# Patient Record
Sex: FEMALE | Employment: UNEMPLOYED | URBAN - METROPOLITAN AREA
[De-identification: names, ages, dates, MRNs, and addresses within clinical notes are randomized per-mention and may not be internally consistent; named-entity substitution may affect disease eponyms.]

---

## 2019-10-06 ENCOUNTER — APPOINTMENT (OUTPATIENT)
Dept: CT IMAGING | Age: 59
End: 2019-10-06
Attending: EMERGENCY MEDICINE
Payer: MEDICAID

## 2019-10-06 ENCOUNTER — HOSPITAL ENCOUNTER (OUTPATIENT)
Age: 59
Setting detail: OBSERVATION
Discharge: HOME HEALTH | End: 2019-10-08
Attending: EMERGENCY MEDICINE | Admitting: INTERNAL MEDICINE
Payer: MEDICAID

## 2019-10-06 DIAGNOSIS — G45.9 TIA (TRANSIENT ISCHEMIC ATTACK): Primary | ICD-10-CM

## 2019-10-06 DIAGNOSIS — R41.82 ALTERED MENTAL STATUS, UNSPECIFIED ALTERED MENTAL STATUS TYPE: ICD-10-CM

## 2019-10-06 DIAGNOSIS — G31.84 MCI (MILD COGNITIVE IMPAIRMENT): ICD-10-CM

## 2019-10-06 DIAGNOSIS — E87.6 HYPOKALEMIA: ICD-10-CM

## 2019-10-06 LAB
ALBUMIN SERPL-MCNC: 4.2 G/DL (ref 3.5–5)
ALBUMIN/GLOB SERPL: 0.9 {RATIO} (ref 1.1–2.2)
ALP SERPL-CCNC: 136 U/L (ref 45–117)
ALT SERPL-CCNC: 14 U/L (ref 12–78)
ANION GAP SERPL CALC-SCNC: 15 MMOL/L (ref 5–15)
APPEARANCE UR: ABNORMAL
AST SERPL-CCNC: 15 U/L (ref 15–37)
BACTERIA URNS QL MICRO: ABNORMAL /HPF
BASOPHILS # BLD: 0 K/UL (ref 0–0.1)
BASOPHILS NFR BLD: 0 % (ref 0–1)
BILIRUB SERPL-MCNC: 0.7 MG/DL (ref 0.2–1)
BILIRUB UR QL CFM: NEGATIVE
BUN SERPL-MCNC: 18 MG/DL (ref 6–20)
BUN/CREAT SERPL: 13 (ref 12–20)
CALCIUM SERPL-MCNC: 10.2 MG/DL (ref 8.5–10.1)
CHLORIDE SERPL-SCNC: 101 MMOL/L (ref 97–108)
CO2 SERPL-SCNC: 25 MMOL/L (ref 21–32)
COLOR UR: ABNORMAL
CREAT SERPL-MCNC: 1.39 MG/DL (ref 0.55–1.02)
DIFFERENTIAL METHOD BLD: ABNORMAL
EOSINOPHIL # BLD: 0 K/UL (ref 0–0.4)
EOSINOPHIL NFR BLD: 0 % (ref 0–7)
EPITH CASTS URNS QL MICRO: ABNORMAL /LPF
ERYTHROCYTE [DISTWIDTH] IN BLOOD BY AUTOMATED COUNT: 14.6 % (ref 11.5–14.5)
GLOBULIN SER CALC-MCNC: 4.9 G/DL (ref 2–4)
GLUCOSE BLD STRIP.AUTO-MCNC: 114 MG/DL (ref 65–100)
GLUCOSE SERPL-MCNC: 128 MG/DL (ref 65–100)
GLUCOSE UR STRIP.AUTO-MCNC: NEGATIVE MG/DL
HCT VFR BLD AUTO: 37.6 % (ref 35–47)
HGB BLD-MCNC: 11.6 G/DL (ref 11.5–16)
HGB UR QL STRIP: ABNORMAL
IMM GRANULOCYTES # BLD AUTO: 0 K/UL (ref 0–0.04)
IMM GRANULOCYTES NFR BLD AUTO: 0 % (ref 0–0.5)
INR PPP: 1 (ref 0.9–1.1)
KETONES UR QL STRIP.AUTO: >80 MG/DL
LEUKOCYTE ESTERASE UR QL STRIP.AUTO: ABNORMAL
LYMPHOCYTES # BLD: 1.7 K/UL (ref 0.8–3.5)
LYMPHOCYTES NFR BLD: 33 % (ref 12–49)
MCH RBC QN AUTO: 23.4 PG (ref 26–34)
MCHC RBC AUTO-ENTMCNC: 30.9 G/DL (ref 30–36.5)
MCV RBC AUTO: 75.8 FL (ref 80–99)
MONOCYTES # BLD: 0.3 K/UL (ref 0–1)
MONOCYTES NFR BLD: 6 % (ref 5–13)
NEUTS SEG # BLD: 3.2 K/UL (ref 1.8–8)
NEUTS SEG NFR BLD: 61 % (ref 32–75)
NITRITE UR QL STRIP.AUTO: NEGATIVE
NRBC # BLD: 0 K/UL (ref 0–0.01)
NRBC BLD-RTO: 0 PER 100 WBC
PH UR STRIP: 6 [PH] (ref 5–8)
PLATELET # BLD AUTO: 410 K/UL (ref 150–400)
PMV BLD AUTO: 8.5 FL (ref 8.9–12.9)
POTASSIUM SERPL-SCNC: 3.1 MMOL/L (ref 3.5–5.1)
POTASSIUM SERPL-SCNC: 3.6 MMOL/L (ref 3.5–5.1)
PROT SERPL-MCNC: 9.1 G/DL (ref 6.4–8.2)
PROT UR STRIP-MCNC: 30 MG/DL
PROTHROMBIN TIME: 10.3 SEC (ref 9–11.1)
RBC # BLD AUTO: 4.96 M/UL (ref 3.8–5.2)
RBC #/AREA URNS HPF: ABNORMAL /HPF (ref 0–5)
SERVICE CMNT-IMP: ABNORMAL
SODIUM SERPL-SCNC: 141 MMOL/L (ref 136–145)
SP GR UR REFRACTOMETRY: 1.02 (ref 1–1.03)
TROPONIN I SERPL-MCNC: <0.05 NG/ML
UA: UC IF INDICATED,UAUC: ABNORMAL
UROBILINOGEN UR QL STRIP.AUTO: 1 EU/DL (ref 0.2–1)
WBC # BLD AUTO: 5.3 K/UL (ref 3.6–11)
WBC URNS QL MICRO: ABNORMAL /HPF (ref 0–4)

## 2019-10-06 PROCEDURE — 70450 CT HEAD/BRAIN W/O DYE: CPT

## 2019-10-06 PROCEDURE — 96361 HYDRATE IV INFUSION ADD-ON: CPT

## 2019-10-06 PROCEDURE — 71045 X-RAY EXAM CHEST 1 VIEW: CPT

## 2019-10-06 PROCEDURE — 99218 HC RM OBSERVATION: CPT

## 2019-10-06 PROCEDURE — 85025 COMPLETE CBC W/AUTO DIFF WBC: CPT

## 2019-10-06 PROCEDURE — 74011250636 HC RX REV CODE- 250/636: Performed by: EMERGENCY MEDICINE

## 2019-10-06 PROCEDURE — 93005 ELECTROCARDIOGRAM TRACING: CPT

## 2019-10-06 PROCEDURE — 74011250636 HC RX REV CODE- 250/636: Performed by: INTERNAL MEDICINE

## 2019-10-06 PROCEDURE — 84132 ASSAY OF SERUM POTASSIUM: CPT

## 2019-10-06 PROCEDURE — 99285 EMERGENCY DEPT VISIT HI MDM: CPT

## 2019-10-06 PROCEDURE — 80053 COMPREHEN METABOLIC PANEL: CPT

## 2019-10-06 PROCEDURE — 85610 PROTHROMBIN TIME: CPT

## 2019-10-06 PROCEDURE — 74011250637 HC RX REV CODE- 250/637: Performed by: INTERNAL MEDICINE

## 2019-10-06 PROCEDURE — 81001 URINALYSIS AUTO W/SCOPE: CPT

## 2019-10-06 PROCEDURE — 84484 ASSAY OF TROPONIN QUANT: CPT

## 2019-10-06 PROCEDURE — 74011250637 HC RX REV CODE- 250/637: Performed by: EMERGENCY MEDICINE

## 2019-10-06 PROCEDURE — 96372 THER/PROPH/DIAG INJ SC/IM: CPT

## 2019-10-06 PROCEDURE — 87086 URINE CULTURE/COLONY COUNT: CPT

## 2019-10-06 PROCEDURE — 36415 COLL VENOUS BLD VENIPUNCTURE: CPT

## 2019-10-06 PROCEDURE — 82962 GLUCOSE BLOOD TEST: CPT

## 2019-10-06 RX ORDER — CHOLECALCIFEROL (VITAMIN D3) 25 MCG
1 TABLET ORAL DAILY
Refills: 11 | COMMUNITY
Start: 2019-08-27

## 2019-10-06 RX ORDER — SODIUM CHLORIDE 0.9 % (FLUSH) 0.9 %
5-40 SYRINGE (ML) INJECTION EVERY 8 HOURS
Status: DISCONTINUED | OUTPATIENT
Start: 2019-10-06 | End: 2019-10-08 | Stop reason: HOSPADM

## 2019-10-06 RX ORDER — ALPRAZOLAM 0.25 MG/1
TABLET ORAL
COMMUNITY

## 2019-10-06 RX ORDER — FLUTICASONE PROPIONATE 50 MCG
1 SPRAY, SUSPENSION (ML) NASAL DAILY
COMMUNITY

## 2019-10-06 RX ORDER — PAROXETINE HYDROCHLORIDE 20 MG/1
40 TABLET, FILM COATED ORAL DAILY
Status: DISCONTINUED | OUTPATIENT
Start: 2019-10-07 | End: 2019-10-08 | Stop reason: HOSPADM

## 2019-10-06 RX ORDER — LABETALOL HCL 20 MG/4 ML
10 SYRINGE (ML) INTRAVENOUS
Status: ACTIVE | OUTPATIENT
Start: 2019-10-06 | End: 2019-10-07

## 2019-10-06 RX ORDER — PRAVASTATIN SODIUM 40 MG/1
40 TABLET ORAL
Status: DISCONTINUED | OUTPATIENT
Start: 2019-10-06 | End: 2019-10-07

## 2019-10-06 RX ORDER — OMEPRAZOLE 40 MG/1
40 CAPSULE, DELAYED RELEASE ORAL DAILY
COMMUNITY

## 2019-10-06 RX ORDER — POTASSIUM CHLORIDE 750 MG/1
40 TABLET, FILM COATED, EXTENDED RELEASE ORAL
Status: ACTIVE | OUTPATIENT
Start: 2019-10-06 | End: 2019-10-07

## 2019-10-06 RX ORDER — ALPRAZOLAM 0.25 MG/1
0.25 TABLET ORAL
Status: DISCONTINUED | OUTPATIENT
Start: 2019-10-06 | End: 2019-10-08 | Stop reason: HOSPADM

## 2019-10-06 RX ORDER — DEXTROMETHORPHAN HYDROBROMIDE, GUAIFENESIN 5; 100 MG/5ML; MG/5ML
650 LIQUID ORAL
COMMUNITY

## 2019-10-06 RX ORDER — GUAIFENESIN 100 MG/5ML
81 LIQUID (ML) ORAL DAILY
Status: DISCONTINUED | OUTPATIENT
Start: 2019-10-07 | End: 2019-10-08 | Stop reason: HOSPADM

## 2019-10-06 RX ORDER — SODIUM CHLORIDE 9 MG/ML
75 INJECTION, SOLUTION INTRAVENOUS CONTINUOUS
Status: DISCONTINUED | OUTPATIENT
Start: 2019-10-06 | End: 2019-10-07

## 2019-10-06 RX ORDER — HEPARIN SODIUM 5000 [USP'U]/ML
5000 INJECTION, SOLUTION INTRAVENOUS; SUBCUTANEOUS EVERY 12 HOURS
Status: DISCONTINUED | OUTPATIENT
Start: 2019-10-06 | End: 2019-10-07 | Stop reason: ALTCHOICE

## 2019-10-06 RX ORDER — ENOXAPARIN SODIUM 100 MG/ML
40 INJECTION SUBCUTANEOUS EVERY 24 HOURS
Status: DISCONTINUED | OUTPATIENT
Start: 2019-10-06 | End: 2019-10-06 | Stop reason: DRUGHIGH

## 2019-10-06 RX ORDER — ONDANSETRON 2 MG/ML
4 INJECTION INTRAMUSCULAR; INTRAVENOUS
Status: ACTIVE | OUTPATIENT
Start: 2019-10-06 | End: 2019-10-07

## 2019-10-06 RX ORDER — PANTOPRAZOLE SODIUM 40 MG/1
40 TABLET, DELAYED RELEASE ORAL DAILY
Status: DISCONTINUED | OUTPATIENT
Start: 2019-10-07 | End: 2019-10-08 | Stop reason: HOSPADM

## 2019-10-06 RX ORDER — PAROXETINE HYDROCHLORIDE 20 MG/1
20 TABLET, FILM COATED ORAL DAILY
Status: DISCONTINUED | OUTPATIENT
Start: 2019-10-07 | End: 2019-10-06

## 2019-10-06 RX ORDER — PAROXETINE HYDROCHLORIDE 40 MG/1
1 TABLET, FILM COATED ORAL DAILY
Refills: 7 | COMMUNITY
Start: 2019-09-09

## 2019-10-06 RX ORDER — ATORVASTATIN CALCIUM 20 MG/1
1 TABLET, FILM COATED ORAL DAILY
Refills: 7 | COMMUNITY
Start: 2019-09-09 | End: 2019-10-08

## 2019-10-06 RX ADMIN — Medication 10 ML: at 21:13

## 2019-10-06 RX ADMIN — PRAVASTATIN SODIUM 40 MG: 40 TABLET ORAL at 21:13

## 2019-10-06 RX ADMIN — SODIUM CHLORIDE 150 ML/HR: 900 INJECTION, SOLUTION INTRAVENOUS at 17:23

## 2019-10-06 RX ADMIN — HEPARIN SODIUM 5000 UNITS: 5000 INJECTION INTRAVENOUS; SUBCUTANEOUS at 21:12

## 2019-10-06 NOTE — PROGRESS NOTES
Telehospitalist: Patient apparently spit out oral potassium replacement.  Will recheck serum potassium and replete if still low

## 2019-10-06 NOTE — ED NOTES
Pt presents ambulatory to ED accompanied by son's girlfriend. Per son's girlfriend, pt \"vomited up her meds today and sounds sad\". During MD assessment, provider asked if pt is confused, pt's son's daughter states \"Yeah that's why I brought her, I don't know\". Pt and visitor poor historians regarding pt's PMH and overall pt presentation. Pt is alert and oriented x 4, RR even and unlabored, skin is warm and dry. Assesment completed and pt updated on plan of care. Emergency Department Nursing Plan of Care       The Nursing Plan of Care is developed from the Nursing assessment and Emergency Department Attending provider initial evaluation. The plan of care may be reviewed in the ED Provider note.     The Plan of Care was developed with the following considerations:   Patient / Family readiness to learn indicated by:verbalized understanding  Persons(s) to be included in education: patient  Barriers to Learning/Limitations:No    Signed     Bella Johnson RN    10/6/2019   5:35 PM

## 2019-10-06 NOTE — ED PROVIDER NOTES
EMERGENCY DEPARTMENT HISTORY AND PHYSICAL EXAM      Date: 10/6/2019  Patient Name: Kathie Daigle    History of Presenting Illness     Chief Complaint   Patient presents with    Vomiting     times two: per pt she was at [facility] on Friday and had abdominal pain: denies pain in triage       History Provided By: Patient and Patient's Son's Girlfriend    HPI: Kathie Daigle, 59 y.o. female with unknown PMHx, presents ambulatory to the ED with cc of vomiting x 2 days. Of note, the pt is accompanied by her son's girlfriend, who had originally stated the pt was having abdominal pain and vomiting, however the pt denied both of these. No active vomiting in the ED. Per pt's son's girlfriend, the pt is \"more confused than usual\" and her speech is \"different. \" Last known normal is unknown. Pt's son's girlfriend states that the pt lives alone and went to check on her at her home today, noting that she was [facility] sick. \"  Code S was initiated. History is limited due to patient's AMS. PCP: No primary care provider on file. Current Facility-Administered Medications   Medication Dose Route Frequency Provider Last Rate Last Dose    sodium chloride (NS) flush 5-40 mL  5-40 mL IntraVENous Q8H Giselle Wheeler MD        labetalol (NORMODYNE;TRANDATE) 20 mg/4 mL (5 mg/mL) injection 10 mg  10 mg IntraVENous NOW Mary Anne Mckeon MD        0.9% sodium chloride infusion  150 mL/hr IntraVENous CONTINUOUS Giselle Wheeler MD        potassium chloride SR (KLOR-CON 10) tablet 40 mEq  40 mEq Oral NOW Mary Anne Mckeon MD           Past History     Past Medical History:  No past medical history on file. Past Surgical History:  No past surgical history on file. Family History:  No family history on file.     Social History:  Social History     Tobacco Use    Smoking status: Not on file   Substance Use Topics    Alcohol use: Not on file    Drug use: Not on file       Allergies:  No Known Allergies    Review of Systems Review of Systems   Unable to perform ROS: Mental status change     Physical Exam   Physical Exam   Constitutional: She is oriented to person, place, and time. She appears well-developed and well-nourished. HENT:   Head: Normocephalic and atraumatic. Mouth/Throat: Oropharynx is clear and moist.   Eyes: Conjunctivae and EOM are normal.   Neck: Normal range of motion and full passive range of motion without pain. Neck supple. Cardiovascular: Normal rate, regular rhythm, S1 normal, S2 normal, normal heart sounds, intact distal pulses and normal pulses. No murmur heard. Pulmonary/Chest: Effort normal and breath sounds normal. No respiratory distress. She has no wheezes. Abdominal: Soft. Normal appearance and bowel sounds are normal. She exhibits no distension. There is no tenderness. There is no rebound. Musculoskeletal: Normal range of motion. Neurological: She is alert and oriented to person, place, and time. She has normal strength. Skin: Skin is warm, dry and intact. No rash noted. Psychiatric: She has a normal mood and affect. Her speech is normal and behavior is normal. Judgment and thought content normal.   Nursing note and vitals reviewed.     Diagnostic Study Results     Labs -     Recent Results (from the past 12 hour(s))   GLUCOSE, POC    Collection Time: 10/06/19  4:14 PM   Result Value Ref Range    Glucose (POC) 114 (H) 65 - 100 mg/dL    Performed by Scot Ogden    CBC WITH AUTOMATED DIFF    Collection Time: 10/06/19  4:20 PM   Result Value Ref Range    WBC 5.3 3.6 - 11.0 K/uL    RBC 4.96 3.80 - 5.20 M/uL    HGB 11.6 11.5 - 16.0 g/dL    HCT 37.6 35.0 - 47.0 %    MCV 75.8 (L) 80.0 - 99.0 FL    MCH 23.4 (L) 26.0 - 34.0 PG    MCHC 30.9 30.0 - 36.5 g/dL    RDW 14.6 (H) 11.5 - 14.5 %    PLATELET 957 (H) 014 - 400 K/uL    MPV 8.5 (L) 8.9 - 12.9 FL    NRBC 0.0 0  WBC    ABSOLUTE NRBC 0.00 0.00 - 0.01 K/uL    NEUTROPHILS 61 32 - 75 %    LYMPHOCYTES 33 12 - 49 %    MONOCYTES 6 5 - 13 % EOSINOPHILS 0 0 - 7 %    BASOPHILS 0 0 - 1 %    IMMATURE GRANULOCYTES 0 0.0 - 0.5 %    ABS. NEUTROPHILS 3.2 1.8 - 8.0 K/UL    ABS. LYMPHOCYTES 1.7 0.8 - 3.5 K/UL    ABS. MONOCYTES 0.3 0.0 - 1.0 K/UL    ABS. EOSINOPHILS 0.0 0.0 - 0.4 K/UL    ABS. BASOPHILS 0.0 0.0 - 0.1 K/UL    ABS. IMM. GRANS. 0.0 0.00 - 0.04 K/UL    DF AUTOMATED     METABOLIC PANEL, COMPREHENSIVE    Collection Time: 10/06/19  4:20 PM   Result Value Ref Range    Sodium 141 136 - 145 mmol/L    Potassium 3.1 (L) 3.5 - 5.1 mmol/L    Chloride 101 97 - 108 mmol/L    CO2 25 21 - 32 mmol/L    Anion gap 15 5 - 15 mmol/L    Glucose 128 (H) 65 - 100 mg/dL    BUN 18 6 - 20 MG/DL    Creatinine 1.39 (H) 0.55 - 1.02 MG/DL    BUN/Creatinine ratio 13 12 - 20      GFR est AA 47 (L) >60 ml/min/1.73m2    GFR est non-AA 39 (L) >60 ml/min/1.73m2    Calcium 10.2 (H) 8.5 - 10.1 MG/DL    Bilirubin, total 0.7 0.2 - 1.0 MG/DL    ALT (SGPT) 14 12 - 78 U/L    AST (SGOT) 15 15 - 37 U/L    Alk.  phosphatase 136 (H) 45 - 117 U/L    Protein, total 9.1 (H) 6.4 - 8.2 g/dL    Albumin 4.2 3.5 - 5.0 g/dL    Globulin 4.9 (H) 2.0 - 4.0 g/dL    A-G Ratio 0.9 (L) 1.1 - 2.2     TROPONIN I    Collection Time: 10/06/19  4:20 PM   Result Value Ref Range    Troponin-I, Qt. <0.05 <0.05 ng/mL   EKG, 12 LEAD, INITIAL    Collection Time: 10/06/19  4:23 PM   Result Value Ref Range    Ventricular Rate 103 BPM    Atrial Rate 103 BPM    P-R Interval 174 ms    QRS Duration 72 ms    Q-T Interval 356 ms    QTC Calculation (Bezet) 466 ms    Calculated P Axis 73 degrees    Calculated R Axis 66 degrees    Calculated T Axis 24 degrees    Diagnosis       Sinus tachycardia  Possible Left atrial enlargement  T wave abnormality, consider inferior ischemia  Abnormal ECG  No previous ECGs available     PROTHROMBIN TIME + INR    Collection Time: 10/06/19  4:30 PM   Result Value Ref Range    INR 1.0 0.9 - 1.1      Prothrombin time 10.3 9.0 - 11.1 sec       Radiologic Studies -   XR CHEST PORT   Final Result Impression: No acute process. CT HEAD WO CONT   Final Result   IMPRESSION:      No acute intracranial abnormality        CT Results  (Last 48 hours)               10/06/19 1658  CT HEAD WO CONT Final result    Impression:  IMPRESSION:       No acute intracranial abnormality       Narrative:  EXAM: CT HEAD WO CONT       INDICATION: AMS       COMPARISON: None. CONTRAST: None. TECHNIQUE: Unenhanced CT of the head was performed using 5 mm images. Brain and   bone windows were generated. CT dose reduction was achieved through use of a   standardized protocol tailored for this examination and automatic exposure   control for dose modulation. FINDINGS:   The ventricles and sulci are normal in size, shape and configuration and   midline. There is no significant white matter disease. There is no intracranial   hemorrhage, extra-axial collection, mass, mass effect or midline shift. The   basilar cisterns are open. No acute infarct is identified. The bone windows   demonstrate no abnormalities. The visualized portions of the paranasal sinuses   and mastoid air cells are clear. CXR Results  (Last 48 hours)               10/06/19 1658  XR CHEST PORT Final result    Impression:  Impression: No acute process. Narrative: Indication: Chest pain       Comparison: None       Portable exam of the chest obtained at 1635 demonstrates normal heart size. There is no acute process in the lung fields. The osseous structures are   unremarkable. Medical Decision Making   I am the first provider for this patient. I reviewed the vital signs, available nursing notes, past medical history, past surgical history, family history and social history. Vital Signs-Reviewed the patient's vital signs.   Patient Vitals for the past 12 hrs:   Temp Pulse Resp BP SpO2   10/06/19 1705  92 21  100 %   10/06/19 1700    126/79    10/06/19 1651  92 18  100 %   10/06/19 1630    133/88    10/06/19 1604    (!) 151/127    10/06/19 1603 98.7 °F (37.1 °C) (!) 120 18 (!) 128/101 98 %       Pulse Oximetry Analysis - 98% on RA    Cardiac Monitor:   Rate: 120 bpm  Rhythm: Sinus Tachycardia      EKG interpretation: (Preliminary)1623  Rhythm: sinus tachycardia; and regular . Rate (approx.): 103; Axis: normal; DC interval: normal; QRS interval: normal ; ST/T wave: T wave abnormality, consider inferior ischemia; Other findings: abnormal ekg. Written by Bryson Walsh. Angy Medina, ED Scribe, as dictated by Javed Farley MD    Records Reviewed: Nursing Notes    Provider Notes (Medical Decision Making):   DDx: AMS, hypertensive emergency, acute CVA, PNA, PE    ED Course:   Initial assessment performed. The patients presenting problems have been discussed, and they are in agreement with the care plan formulated and outlined with them. I have encouraged them to ask questions as they arise throughout their visit. Joce Felix  1960    Arrival time to ED: [address]: 80    Physician at Bedside: [address]     CT Order Time: 1618    ACT Page: [id number]    ACT Call Back: [id number]      Consult Note:  4:20 Deana Barker MD, spoke with Rico Aponte MD,  Specialty: Neurology  Discussed pt's hx, disposition, and available diagnostic and imaging results. Reviewed care plans. Consultant agrees with plans as outlined. Will evaluate pt via TeleNeurology and call back after his examination. Consult Note:  5:08 Deana Barker MD, spoke with Rico Aponte MD,  Specialty: Neurology  Discussed pt's hx, disposition, and available diagnostic and imaging results. Reviewed care plans. Consultant agrees with plans as outlined. She endorsed on examination that the pt had decreased sensation on the L side and confusion. She stated that right hemisphere strokes can present this way. Would like for pt to have an MRI and 23 hour observation.      CONSULT NOTE:   5:13 PM  Javed Farley MD, spoke with Eligio Hutchison Soco Gaston MD,   Specialty: Hospitalist  Discussed pt's hx, disposition, and available diagnostic and imaging results. Reviewed care plans. Consultant will evaluate pt for admission. Written by Orlin Chicas. Den Najera, ED Scribe, as dictated by Sherry Estes MD      Critical Care Time:   CRITICAL CARE NOTE :  5:14 PM    IMPENDING DETERIORATION -CNS and Metabolic  ASSOCIATED RISK FACTORS - Metabolic changes and CNS Decompensation  MANAGEMENT- Bedside Assessment and Supervision of Care  INTERPRETATION -  Xrays, CT Scan, ECG and Blood Pressure  INTERVENTIONS - Neurologic interventions  and Metobolic interventions  CASE REVIEW - Hospitalist, Medical Sub-Specialist, Nursing and Family  TREATMENT RESPONSE -Stable  PERFORMED BY - Self    NOTES   :  I have spent 35 minutes of critical care time involved in lab review, consultations with specialist, family decision- making, bedside attention and documentation. During this entire length of time I was immediately available to the patient . Sherry Estes MD      Disposition:  Admit Note:  5:13 PM  Pt is being admitted by Dr. Soco Gaston. The results of their tests and reason(s) for their admission have been discussed with pt and/or available family. They convey agreement and understanding for the need to be admitted and for admission diagnosis. PLAN:  1. Admit to Hospitalist    Diagnosis     Clinical Impression: No diagnosis found. This note is prepared by Orlin Chicas. Den Najera, acting as Scribe for MD Sherry Piedra MD: The scribe's documentation has been prepared under my direction and personally reviewed by me in its entirety. I confirm that the note above accurately reflects all work, treatment, procedures, and medical decision making performed by me. This note will not be viewable in [address]

## 2019-10-06 NOTE — PROGRESS NOTES
Patient arrived from the ED. Ambulated to her bed, and attached to telemetry, box 16. Vital signs obtained, and shift report given to [facility], RN.

## 2019-10-06 NOTE — PROGRESS NOTES
Patient potassium 3.1 and was unable to take potassium PO in ED. Notified on call MD via tele [employer] for replenishment. Awaiting response.

## 2019-10-06 NOTE — ED NOTES
During tele-neuro assessment, pt reports she has \"speech problem\" and is \"supposed to wear hearing aids\" but she does not have them, pt also reports she was in speech therapy \"a long time ago\".

## 2019-10-06 NOTE — PROGRESS NOTES
Pt admitted remotely by myself , following discussion with ED physician, and review of labwork, radiology, old notes and patient care record. Care on castro followed via [address] Record remotely , as well as with discussion with nursing staff on castro.   A/P    Acute encephalopathy and slurred speech:POA rule out TIA versus CVA  -tele admission  -permissive HTN  -ASA/Plavix  -High dose statin  -Fasting lipid panel, Hba1c  -neurology consult  -echo, MRI head and MRA head and neck  -dysphagia screening  PT/OT eval    Hyperlipidemia   resume statin    GERD  Resume omeprazole    History of anxiety/depression  Resume Paxil  Xanax as needed) home medication     Hypokalemia  replace      FULL HMP TO FOLLOW

## 2019-10-06 NOTE — ED NOTES
Radiology reports they are ready for pt in CT. Tele-neurologist at bedside via computer speaking with pt.   Per neurologist, pt can go to CT after she finishes pt's neuro exam.

## 2019-10-06 NOTE — ED NOTES
TRANSFER - OUT REPORT:    Verbal report given to LATASHA Garcia(name) on Jose Hester  being transferred to 226(unit) for routine progression of care       Report consisted of patients Situation, Background, Assessment and   Recommendations(SBAR). Information from the following report(s) SBAR, ED Summary, [facility] and Recent Results was reviewed with the receiving nurse. Lines:   Peripheral IV 10/06/19 Left Antecubital (Active)   Site Assessment Clean, dry, & intact 10/6/2019  5:15 PM   Phlebitis Assessment 0 10/6/2019  5:15 PM   Infiltration Assessment 0 10/6/2019  5:15 PM   Dressing Status Clean, dry, & intact 10/6/2019  5:15 PM   Dressing Type Transparent 10/6/2019  5:15 PM   Hub Color/Line Status Patent 10/6/2019  5:15 PM   Action Taken Blood drawn 10/6/2019  5:15 PM        Opportunity for questions and clarification was provided.       Patient transported with:   Registered Nurse

## 2019-10-07 ENCOUNTER — APPOINTMENT (OUTPATIENT)
Dept: VASCULAR SURGERY | Age: 59
End: 2019-10-07
Attending: INTERNAL MEDICINE
Payer: MEDICAID

## 2019-10-07 ENCOUNTER — APPOINTMENT (OUTPATIENT)
Dept: MRI IMAGING | Age: 59
End: 2019-10-07
Attending: INTERNAL MEDICINE
Payer: MEDICAID

## 2019-10-07 ENCOUNTER — APPOINTMENT (OUTPATIENT)
Dept: NON INVASIVE DIAGNOSTICS | Age: 59
End: 2019-10-07
Attending: INTERNAL MEDICINE
Payer: MEDICAID

## 2019-10-07 PROBLEM — N39.0 UTI (URINARY TRACT INFECTION): Status: ACTIVE | Noted: 2019-10-07

## 2019-10-07 LAB
ANION GAP SERPL CALC-SCNC: 10 MMOL/L (ref 5–15)
ATRIAL RATE: 103 BPM
BUN SERPL-MCNC: 19 MG/DL (ref 6–20)
BUN/CREAT SERPL: 20 (ref 12–20)
CALCIUM SERPL-MCNC: 9.2 MG/DL (ref 8.5–10.1)
CALCULATED P AXIS, ECG09: 73 DEGREES
CALCULATED R AXIS, ECG10: 66 DEGREES
CALCULATED T AXIS, ECG11: 24 DEGREES
CHLORIDE SERPL-SCNC: 105 MMOL/L (ref 97–108)
CHOLEST SERPL-MCNC: 163 MG/DL
CO2 SERPL-SCNC: 27 MMOL/L (ref 21–32)
CREAT SERPL-MCNC: 0.97 MG/DL (ref 0.55–1.02)
DIAGNOSIS, 93000: NORMAL
ERYTHROCYTE [DISTWIDTH] IN BLOOD BY AUTOMATED COUNT: 14.5 % (ref 11.5–14.5)
EST. AVERAGE GLUCOSE BLD GHB EST-MCNC: 126 MG/DL
GLUCOSE SERPL-MCNC: 101 MG/DL (ref 65–100)
HBA1C MFR BLD: 6 % (ref 4.2–6.3)
HCT VFR BLD AUTO: 32.7 % (ref 35–47)
HDLC SERPL-MCNC: 38 MG/DL
HDLC SERPL: 4.3 {RATIO} (ref 0–5)
HGB BLD-MCNC: 10.1 G/DL (ref 11.5–16)
LDLC SERPL CALC-MCNC: 108.2 MG/DL (ref 0–100)
LEFT CCA PROX DIAS: 25.4 CM/S
LEFT CCA PROX SYS: 100.4 CM/S
LEFT ICA DIST DIAS: 27.4 CM/S
LEFT ICA DIST SYS: 108.2 CM/S
LEFT ICA MID DIAS: 23.3 CM/S
LEFT ICA MID SYS: 75.3 CM/S
LEFT ICA PROX DIAS: 23.4 CM/S
LEFT ICA PROX SYS: 84.9 CM/S
LEFT ICA/CCA SYS: 1.08
LEFT VERTEBRAL DIAS: 17.55 CM/S
LEFT VERTEBRAL SYS: 57 CM/S
LIPID PROFILE,FLP: ABNORMAL
MCH RBC QN AUTO: 23.5 PG (ref 26–34)
MCHC RBC AUTO-ENTMCNC: 30.9 G/DL (ref 30–36.5)
MCV RBC AUTO: 76.2 FL (ref 80–99)
NRBC # BLD: 0 K/UL (ref 0–0.01)
NRBC BLD-RTO: 0 PER 100 WBC
P-R INTERVAL, ECG05: 174 MS
PLATELET # BLD AUTO: 344 K/UL (ref 150–400)
PMV BLD AUTO: 8.7 FL (ref 8.9–12.9)
POTASSIUM SERPL-SCNC: 3.1 MMOL/L (ref 3.5–5.1)
Q-T INTERVAL, ECG07: 356 MS
QRS DURATION, ECG06: 72 MS
QTC CALCULATION (BEZET), ECG08: 466 MS
RBC # BLD AUTO: 4.29 M/UL (ref 3.8–5.2)
RIGHT CCA DIST DIAS: 19.5 CM/S
RIGHT CCA DIST SYS: 63.7 CM/S
RIGHT CCA PROX DIAS: 19.5 CM/S
RIGHT CCA PROX SYS: 76.8 CM/S
RIGHT ICA DIST DIAS: 16.9 CM/S
RIGHT ICA DIST SYS: 48.2 CM/S
RIGHT ICA MID DIAS: 16.9 CM/S
RIGHT ICA MID SYS: 48.2 CM/S
RIGHT ICA PROX DIAS: 21.1 CM/S
RIGHT ICA PROX SYS: 60.4 CM/S
RIGHT ICA/CCA SYS: 1
RIGHT VERTEBRAL DIAS: 17.03 CM/S
RIGHT VERTEBRAL SYS: 70.6 CM/S
SODIUM SERPL-SCNC: 142 MMOL/L (ref 136–145)
TRIGL SERPL-MCNC: 84 MG/DL (ref ?–150)
VENTRICULAR RATE, ECG03: 103 BPM
VLDLC SERPL CALC-MCNC: 16.8 MG/DL
WBC # BLD AUTO: 5.7 K/UL (ref 3.6–11)

## 2019-10-07 PROCEDURE — 70551 MRI BRAIN STEM W/O DYE: CPT

## 2019-10-07 PROCEDURE — 74011250636 HC RX REV CODE- 250/636: Performed by: INTERNAL MEDICINE

## 2019-10-07 PROCEDURE — 93880 EXTRACRANIAL BILAT STUDY: CPT

## 2019-10-07 PROCEDURE — 96375 TX/PRO/DX INJ NEW DRUG ADDON: CPT

## 2019-10-07 PROCEDURE — 96372 THER/PROPH/DIAG INJ SC/IM: CPT

## 2019-10-07 PROCEDURE — 97112 NEUROMUSCULAR REEDUCATION: CPT | Performed by: OCCUPATIONAL THERAPIST

## 2019-10-07 PROCEDURE — 96365 THER/PROPH/DIAG IV INF INIT: CPT

## 2019-10-07 PROCEDURE — 80061 LIPID PANEL: CPT

## 2019-10-07 PROCEDURE — 80048 BASIC METABOLIC PNL TOTAL CA: CPT

## 2019-10-07 PROCEDURE — 36415 COLL VENOUS BLD VENIPUNCTURE: CPT

## 2019-10-07 PROCEDURE — 97116 GAIT TRAINING THERAPY: CPT

## 2019-10-07 PROCEDURE — 97161 PT EVAL LOW COMPLEX 20 MIN: CPT

## 2019-10-07 PROCEDURE — 96361 HYDRATE IV INFUSION ADD-ON: CPT

## 2019-10-07 PROCEDURE — 74011250637 HC RX REV CODE- 250/637: Performed by: INTERNAL MEDICINE

## 2019-10-07 PROCEDURE — 74011000258 HC RX REV CODE- 258: Performed by: INTERNAL MEDICINE

## 2019-10-07 PROCEDURE — 83036 HEMOGLOBIN GLYCOSYLATED A1C: CPT

## 2019-10-07 PROCEDURE — 97165 OT EVAL LOW COMPLEX 30 MIN: CPT | Performed by: OCCUPATIONAL THERAPIST

## 2019-10-07 PROCEDURE — 95816 EEG AWAKE AND DROWSY: CPT | Performed by: PSYCHIATRY & NEUROLOGY

## 2019-10-07 PROCEDURE — 85027 COMPLETE CBC AUTOMATED: CPT

## 2019-10-07 PROCEDURE — 99218 HC RM OBSERVATION: CPT

## 2019-10-07 RX ORDER — POTASSIUM CHLORIDE AND SODIUM CHLORIDE 900; 300 MG/100ML; MG/100ML
INJECTION, SOLUTION INTRAVENOUS CONTINUOUS
Status: DISCONTINUED | OUTPATIENT
Start: 2019-10-07 | End: 2019-10-08

## 2019-10-07 RX ORDER — ENOXAPARIN SODIUM 100 MG/ML
40 INJECTION SUBCUTANEOUS EVERY 24 HOURS
Status: DISCONTINUED | OUTPATIENT
Start: 2019-10-07 | End: 2019-10-08 | Stop reason: HOSPADM

## 2019-10-07 RX ORDER — SODIUM CHLORIDE 0.9 % (FLUSH) 0.9 %
10 SYRINGE (ML) INJECTION AS NEEDED
Status: DISCONTINUED | OUTPATIENT
Start: 2019-10-07 | End: 2019-10-08 | Stop reason: HOSPADM

## 2019-10-07 RX ORDER — ATORVASTATIN CALCIUM 40 MG/1
40 TABLET, FILM COATED ORAL
Status: DISCONTINUED | OUTPATIENT
Start: 2019-10-07 | End: 2019-10-08 | Stop reason: HOSPADM

## 2019-10-07 RX ORDER — SODIUM CHLORIDE 0.9 % (FLUSH) 0.9 %
SYRINGE (ML) INJECTION
Status: DISPENSED
Start: 2019-10-07 | End: 2019-10-08

## 2019-10-07 RX ORDER — POTASSIUM CHLORIDE 1.5 G/1.77G
40 POWDER, FOR SOLUTION ORAL
Status: COMPLETED | OUTPATIENT
Start: 2019-10-07 | End: 2019-10-07

## 2019-10-07 RX ADMIN — ASPIRIN 81 MG 81 MG: 81 TABLET ORAL at 08:58

## 2019-10-07 RX ADMIN — Medication 10 ML: at 14:22

## 2019-10-07 RX ADMIN — ENOXAPARIN SODIUM 40 MG: 100 INJECTION SUBCUTANEOUS at 08:57

## 2019-10-07 RX ADMIN — CEFTRIAXONE SODIUM 1 G: 1 INJECTION, POWDER, FOR SOLUTION INTRAMUSCULAR; INTRAVENOUS at 08:59

## 2019-10-07 RX ADMIN — Medication 10 ML: at 21:39

## 2019-10-07 RX ADMIN — PAROXETINE HYDROCHLORIDE 40 MG: 20 TABLET, FILM COATED ORAL at 08:58

## 2019-10-07 RX ADMIN — Medication 10 ML: at 15:30

## 2019-10-07 RX ADMIN — PANTOPRAZOLE SODIUM 40 MG: 40 TABLET, DELAYED RELEASE ORAL at 08:58

## 2019-10-07 RX ADMIN — Medication 10 ML: at 06:00

## 2019-10-07 RX ADMIN — POTASSIUM CHLORIDE 40 MEQ: 1.5 POWDER, FOR SOLUTION ORAL at 08:58

## 2019-10-07 RX ADMIN — SODIUM CHLORIDE 75 ML/HR: 900 INJECTION, SOLUTION INTRAVENOUS at 04:16

## 2019-10-07 RX ADMIN — POTASSIUM CHLORIDE AND SODIUM CHLORIDE: 900; 300 INJECTION, SOLUTION INTRAVENOUS at 11:02

## 2019-10-07 RX ADMIN — Medication 10 ML: at 15:31

## 2019-10-07 RX ADMIN — ATORVASTATIN CALCIUM 40 MG: 40 TABLET, FILM COATED ORAL at 21:38

## 2019-10-07 NOTE — PROGRESS NOTES
Notified on call tele hospitalist via tele med IQ of patient's AM potassium of 3.1, trending down from 3.6, awaiting response.

## 2019-10-07 NOTE — PROGRESS NOTES
Speech pathology  Orders received, chart reviewed, spoke with RN and Dr. Janet Santoro. Patient passed STAND and was placed on a regular diet. There were reports of slurred speech but patient's MRI is negative for acute infarct. Nursing reports no difficulties drinking out of water bottles but per report, patient has some trouble chewing/swallowing certain solids and RN report that she was on a mechanical soft diet [place] [facility] [place]. If only concern is with difficulty chewing harder solids, recommend either a mechanical soft or dental soft diet for ease of mastication. Formal speech/swallow evaluation not indicated at this time. Discussed case with Dr. Janet Santoro.      Thanks,  Feliberto Barr M.S. CCC-SLP

## 2019-10-07 NOTE — PROGRESS NOTES
Problem: Self Care Deficits Care Plan (Adult)  Goal: *Acute Goals and Plan of Care (Insert Text)  Description    FUNCTIONAL STATUS PRIOR TO ADMISSION: Patient was independent and active without use of DME per pt. She uses public transportation including for grocery shopping. Pt also stated mild confusion at times and falls due to dizziness. HOME SUPPORT: The patient lived alone with pt's son's girlfriend to provide assistance. Occupational Therapy Goals  Initiated 10/7/2019  1. Patient will perform grooming and bathing standing at sink with modified independence within 7 day(s). 2.  Patient will perform lower body dressing including gathering clothing with modified independence within 7 day(s). 3.  Patient will perform toilet transfers with modified independence within 7 day(s). 4.  Patient will perform all aspects of toileting with modified independence within 7 day(s). 5.  Patient will participate in upper extremity therapeutic exercise/activities with modified independence for 10 minutes within 7 day(s). 6.  Patient will utilize energy conservation techniques during functional activities with verbal and visual cues within 7 day(s). Outcome: Progressing Towards Goal     OCCUPATIONAL THERAPY EVALUATION  Patient: Saul Modi (67 y.o. female)  Date: 10/7/2019  Primary Diagnosis: TIA (transient ischemic attack) [G45.9]        Precautions: Fall    ASSESSMENT  Based on the objective data described below, the patient presents with slow processing and mild slurred speech, decreased endurance and safety following admission for possible CVA. CT of head negative and pt awaiting MRI. BUEs are symmetrical in strength, coordination and sensation. Pt lives alone and uses public transportation without cognitive assist.  Based on above pt will require cognitive assist for safety. Recommend MoCA next session.     Current Level of Function Impacting Discharge (ADLs/self-care): SBA to CGA for LE ADLs, toileting and functional mobility    Functional Outcome Measure: The patient scored Total A-D  Total A-D (Motor Function): 65/66 on the Fugl-White Assessment which is indicative of no impairment in upper extremity functional status. Other factors to consider for discharge: pt lives alone     Patient will benefit from skilled therapy intervention to address the above noted impairments. PLAN :  Recommendations and Planned Interventions: self care training, functional mobility training, therapeutic exercise, balance training, therapeutic activities, cognitive retraining, endurance activities, patient education, home safety training and family training/education    Frequency/Duration: Patient will be followed by occupational therapy 2 times a week to address goals. Recommendation for discharge: (in order for the patient to meet his/her long term goals)  Occupational therapy at least 2 days/week in the home     This discharge recommendation:  Has not yet been discussed the attending provider and/or case management    Equipment recommendations for successful discharge (if) home: TBD        SUBJECTIVE:   Patient stated I am here for a sour stomach and confusion.     OBJECTIVE DATA SUMMARY:   HISTORY:   History reviewed. No pertinent past medical history. History reviewed. No pertinent surgical history.   Expanded or extensive additional review of patient history:     Home Situation  Home Environment: Apartment  # Steps to Enter: 6  Rails to Enter: Yes  Hand Rails : Bilateral  One/Two Story Residence: Two story  # of Interior Steps: 14  Living Alone: Yes  Support Systems: Child(kosta)  Patient Expects to be Discharged to[de-identified] Apartment  Current DME Used/Available at Home: Shower chair  Tub or Shower Type: Tub/Shower combination    Hand dominance: Right    EXAMINATION OF PERFORMANCE DEFICITS:  Cognitive/Behavioral Status:  Neurologic State: Alert  Orientation Level: Oriented X4(slow processing)  Cognition: Decreased attention/concentration; Follows commands  Perception: Appears intact  Perseveration: No perseveration noted  Safety/Judgement: Awareness of environment; Fall prevention    Hearing: Auditory  Auditory Impairment: None    Vision/Perceptual:    Acuity: Able to read clock/calendar on wall without difficulty; Able to read employee name badge without difficulty    Corrective Lenses: Glasses    Range of Motion:  AROM: Generally decreased, functional  PROM: Generally decreased, functional                      Strength:  Strength: Generally decreased, functional(BUEs 4/5)                Coordination:  Coordination: Generally decreased, functional  Fine Motor Skills-Upper: Left Intact; Right Intact    Gross Motor Skills-Upper: Left Intact; Right Intact    Tone & Sensation:  Tone: Normal  Sensation: Impaired(pt c/o numbness in B hands- chronic)                      Balance:  Sitting: Intact  Standing: Impaired  Standing - Static: Good  Standing - Dynamic : Fair    Functional Mobility and Transfers for ADLs:  Bed Mobility:  Rolling: Modified independent  Supine to Sit: Modified independent  Sit to Supine: Modified independent  Scooting: Modified independent    Transfers:  Sit to Stand: Stand-by assistance  Stand to Sit: Stand-by assistance  Bed to Chair: Stand-by assistance  Bathroom Mobility: Contact guard assistance  Toilet Transfer : Stand-by assistance    ADL Assessment:  Feeding: Modified independent    Oral Facial Hygiene/Grooming: Stand-by assistance(standing at sink to wash hands)    Bathing: Contact guard assistance(for safety)    Upper Body Dressing: Setup    Lower Body Dressing: Contact guard assistance(for safety with standing to manage clothing)    Toileting: Contact guard assistance(for safety with standing to manage clothing)                ADL Intervention and task modifications:  Patient was educated on the benefits of maintaining activity tolerance, functional mobility, and independence with self care tasks during acute stay. Encouraged patient to be out of bed for all meals, perform daily ADLs (as approved by RN/MD regarding bathing etc), performing functional mobility to/from bathroom, and increasing time OOB daily with assist. Patient educated about the importance of maintaining activity tolerance to ensure safe return home and to baseline. Patient verbalized understanding of education. Cognitive Retraining  Safety/Judgement: Awareness of environment; Fall prevention    Functional Measure:  Fugl-White Assessment of Motor Recovery after Stroke: BUEs  Reflex Activity  Flexors/Biceps/Fingers: Can be elicited  Extensors/Triceps: Can be elicited  Reflex Subtotal: 4    Volitional Movement Within Synergies  Shoulder Retraction: Full  Shoulder Elevation: Full  Shoulder Abduction (90 degrees): Full  Shoulder External Rotation: Full  Elbow Flexion: Full  Forearm Supination: Full  Shoulder Adduction/Internal Rotation: Full  Elbow Extension: Full  Forearm Pronation: Full  Subtotal: 18    Volitional Movement Mixing Synergies  Hand to Lumbar Spine: Full  Shoulder Flexion (0-90 degrees): Full  Pronation-Supination: Full  Subtotal: 6    Volitional Movement With Little or No Synergy  Shoulder Abduction (0-90 degrees): Full  Shoulder Flexion ( degrees): Partial  Pronation/Supination: Full  Subtotal : 5    Normal Reflex Activity  Biceps, Triceps, Finger Flexors:  Full  Subtotal : 2    Upper Extremity Total   Upper Extremity Total: 35    Wrist  Stability at 15 Degree Dorsiflexion: Full  Repeated Dorsiflexion/ Volar Flexion: Full  Stability at 15 Degree Dorsiflexion: Full  Repeated Dorsiflexion/ Volar Flexion: Full  Circumduction: Full  Wrist Total: 10    Hand  Mass Flexion: Full  Mass Extension: Full  Grasp A: Full  Grasp B: Full  Grasp C: Full  Grasp D: Full  Grasp E: Full  Hand Total: 14    Coordination/Speed  Tremor: None  Dysmetria: None  Time: <1s  Coordination/Speed Total : 6    Total A-D  Total A-D (Motor Function): 65/66     This is a reliable/valid measure of arm function after a neurological event. It has established value to characterize functional status and for measuring spontaneous and therapy-induced recovery; tests proximal and distal motor functions. Fugl-White Assessment - UE scores recorded between five and 30 days post neurologic event can be used to predict UE recovery at six months post neurologic event. Severe = 0-21 points   Moderately Severe = 22-33 points   Moderate = 34-47 points   Mild = 48-66 points  [place] Masters, LIBERTAD Weiner, & SARAI Carpenter (1992). Measurement of motor recovery after stroke: Outcome assessment and sample size requirements. Stroke, 23, pp. 5881-5302.   ------------------------------------------------------------------------------------------------------------------------------------------------------------------  MCID:  Stroke:   Angelia Ruelas et al, 2001; n = 171; mean age 79 (6) years; assessed within 16 (12) days of stroke, Acute Stroke)  FMA Motor Scores from Admission to Discharge   10 point increase in FMA Upper Extremity = 1.5 change in discharge FIM   10 point increase in FMA Lower Extremity = 1.9 change in discharge FIM  MDC:   Stroke:   Rachel Canas et al, 2008, n = 14, mean age = 59.9 (14.6) years, assessed on average 14 (6.5) months post stroke, Chronic Stroke)   FMA = 5.2 points for the Upper Extremity portion of the assessment     Barthel Index:    Bathin  Bladder: 10  Bowels: 10  Groomin  Dressin  Feeding: 10  Mobility: 0  Stairs: 0  Toilet Use: 5  Transfer (Bed to Chair and Back): 10  Total: 55/100        The Barthel ADL Index: Guidelines  1. The index should be used as a record of what a patient does, not as a record of what a patient could do. 2. The main aim is to establish degree of independence from any help, physical or verbal, however minor and for whatever reason. 3. The need for supervision renders the patient not independent.   4. A patient's performance should be established using the best available evidence. Asking the patient, friends/relatives and nurses are the usual sources, but direct observation and common sense are also important. However direct testing is not needed. 5. Usually the patient's performance over the preceding 24-48 hours is important, but occasionally longer periods will be relevant. 6. Middle categories imply that the patient supplies over 50 per cent of the effort. 7. Use of aids to be independent is allowed. Geri Aleman., Barthel, D.W. (3437). Functional evaluation: the Barthel Index. [facility] (14)2. RORY Lincoln, Yessi Meza., Cristino Wiggins., [place], [address] (). Measuring the change indisability after inpatient rehabilitation; comparison of the responsiveness of the Barthel Index and Functional [place] Measure. Journal of Neurology, Neurosurgery, and Psychiatry, 66(4), 502-214. Lorrie Allred N.J.A, KYLE Babcock.AURE.JAYY, & Anatoly Gutiérrez M.A. (2004.) Assessment of post-stroke quality of life in cost-effectiveness studies: The usefulness of the Barthel Index and the EuroQoL-5D. Quality of Life Research, 15, 737-92        Occupational Therapy Evaluation Charge Determination   History Examination Decision-Making   LOW Complexity : Brief history review  MEDIUM Complexity : 3-5 performance deficits relating to physical, cognitive , or psychosocial skils that result in activity limitations and / or participation restrictions MEDIUM Complexity : Patient may present with comorbidities that affect occupational performnce.  Miniml to moderate modification of tasks or assistance (eg, physical or verbal ) with assesment(s) is necessary to enable patient to complete evaluation       Based on the above components, the patient evaluation is determined to be of the following complexity level: LOW   Pain Ratin/10    Activity Tolerance:   Fair and tolerates ADLs without rest breaks  Please refer to the flowsheet for vital signs taken during this treatment. After treatment patient left in no apparent distress:    Sitting in chair and Call bell within reach    COMMUNICATION/EDUCATION:   The patients plan of care was discussed with: Registered Nurse. Patient was educated regarding her deficit(s) of impaired cognition and slurred speech as this relates to her diagnosis of possible CVA. She demonstrated Guarded understanding as evidenced by some awareness of situation. Patient and/or family was verbally educated on the BE FAST acronym for signs/symptoms of CVA and TIA. All questions answered with patient indicating fair understanding. Home safety education was provided and the patient/caregiver indicated understanding., Patient/family have participated as able in goal setting and plan of care. and Patient/family agree to work toward stated goals and plan of care. This patients plan of care is appropriate for delegation to [facility].     Thank you for this referral.  Laura Tan OT  Time Calculation: 20 mins

## 2019-10-07 NOTE — PROGRESS NOTES
Bedside shift change report given to LATASHA Bentley/RUBENS Andrews (oncoming nurse) by Samson Le (offgoing nurse). Report included the following information SBAR, Kardex, Intake/Output, MAR, Recent Results and Cardiac Rhythm NSR,SB.

## 2019-10-07 NOTE — PROGRESS NOTES
Problem: Mobility Impaired (Adult and Pediatric)  Goal: *Acute Goals and Plan of Care (Insert Text)  Description    FUNCTIONAL STATUS PRIOR TO ADMISSION: Patient was independent and active without use of DME.    HOME SUPPORT PRIOR TO ADMISSION: The patient lived alone with family to provide assistance. Physical Therapy Goals  Initiated 10/7/2019  1. Patient will move from supine to sit and sit to supine , scoot up and down and roll side to side in bed with independence within 7 day(s). 2.  Patient will transfer from bed to chair and chair to bed with independence using the least restrictive device within 7 day(s). 3.  Patient will perform sit to stand with independence within 7 day(s). 4.  Patient will ambulate with independence for 400 feet with the least restrictive device within 7 day(s). 5.  Patient will ascend/descend 10 stairs with handrail(s) with supervision/set-up within 7 day(s). 6.  Patient will improve Carr Balance score by 7 points within 7 days. Outcome: Progressing Towards Goal  PHYSICAL THERAPY EVALUATION- NEURO POPULATION  Patient: Joce Sprain (42 y.o. female)  Date: 10/7/2019  Primary Diagnosis: TIA (transient ischemic attack) [G45.9]       Precautions:   Fall      ASSESSMENT  Based on the objective data described below, the patient presents with decreased safety awareness and mildly slurred speech, however baseline for pt is unknown. Pt. Is mod I for bed mobility and needs supervision level assistance for transfers. She ambulated 200' with supervision and no AD. Pt lives alone with family checking on her, she has 14 steps leading to her apartment. Pt will likely need increased family supervision at discharge. Current Level of Function Impacting Discharge (mobility/balance): Good mobility, assess safety awareness    Functional Outcome Measure: The patient scored Total: 45/56 on the Carr Balance Assessment which is indicative of low fall risk.       Patient will benefit from skilled therapy intervention to address the above noted impairments. PLAN :  Recommendations and Planned Interventions: gait training, therapeutic exercises and neuromuscular re-education      Frequency/Duration: Patient will be followed by physical therapy:  2 times a week to address goals. Recommendation for discharge: (in order for the patient to meet his/her long term goals)  Increased family supervision. This discharge recommendation:  Has not yet been discussed the attending provider and/or case management    Equipment recommendations for successful discharge (if) home: none         SUBJECTIVE:   Patient stated I feel ok.     OBJECTIVE DATA SUMMARY:   HISTORY:    History reviewed. No pertinent past medical history. History reviewed. No pertinent surgical history. Personal factors and/or comorbidities impacting plan of care: safety    Home Situation  Home Environment: Apartment  # Steps to Enter: 6  Rails to Enter: Yes  Hand Rails : Bilateral  One/Two Story Residence: Two story  # of Interior Steps: 14  Living Alone: Yes  Support Systems: Child(kosta)  Patient Expects to be Discharged to[de-identified] Apartment  Current DME Used/Available at Home: Shower chair  Tub or Shower Type: Tub/Shower combination    EXAMINATION/PRESENTATION/DECISION MAKING:   Critical Behavior:  Neurologic State: Alert  Orientation Level: Oriented X4  Cognition: Appropriate decision making, Appropriate for age attention/concentration, Decreased attention/concentration, Follows commands  Safety/Judgement: Awareness of environment, Fall prevention  Hearing:   Auditory  Auditory Impairment: None  Range Of Motion:  AROM: Generally decreased, functional  PROM: Generally decreased, functional  Strength:    Strength: Generally decreased, functional(BUEs 4/5)  Tone & Sensation:   Tone: Normal  Sensation: Impaired(pt c/o numbness in B hands- chronic)  Coordination:  Coordination: Generally decreased, functional  Vision:   Acuity: Able to read clock/calendar on wall without difficulty; Able to read employee name badge without difficulty  Corrective Lenses: Glasses  Functional Mobility:  Bed Mobility:  Rolling: Modified independent  Supine to Sit: Modified independent  Sit to Supine: Modified independent  Scooting: Modified independent  Transfers:  Sit to Stand: Stand-by assistance  Stand to Sit: Stand-by assistance  Bed to Chair: Stand-by assistance  Balance:   Sitting: Intact  Standing: Impaired  Standing - Static: Good  Standing - Dynamic : Fair  Ambulation/Gait Training:  Distance (ft): 200 Feet (ft)  Ambulation - Level of Assistance: Supervision  Gait Abnormalities: Decreased step clearance  Base of Support: Narrowed  Speed/Anna: Pace decreased (<100 feet/min)  Step Length: Left shortened;Right shortened      Functional Measure  Carr Balance Test:    Sitting to Standing: 3  Standing Unsupported: 4  Sitting with Back Unsupported: 4  Standing to Sitting: 3  Transfers: 2  Standing Unsupported with Eyes Closed: 4  Standing Unsupported with Feet Together: 3  Reach Forward with Outstretched Arm: 4   Object: 3  Turn to Look Over Shoulders: 3  Turn 360 Degrees: 2  Alternate Foot on Step/Stool: 3  Standing Unsupported One Foot in Front: 3  Stand on One Le  Total: 45/56         56=Maximum possible score;   0-20=High fall risk  21-40=Moderate fall risk   41-56=Low fall risk        Physical Therapy Evaluation Charge Determination   History Examination Presentation Decision-Making   LOW Complexity : Zero comorbidities / personal factors that will impact the outcome / POC LOW Complexity : 1-2 Standardized tests and measures addressing body structure, function, activity limitation and / or participation in recreation  LOW Complexity : Stable, uncomplicated  LOW Complexity : FOTO score of       Based on the above components, the patient evaluation is determined to be of the following complexity level: LOW       Activity Tolerance: Good  Please refer to the flowsheet for vital signs taken during this treatment. After treatment patient left in no apparent distress:   Sitting in chair    COMMUNICATION/EDUCATION:   The patients plan of care was discussed with: Registered Nurse. Patient was educated regarding her deficit(s) of compromised safety awareness as this relates to her diagnosis of TIA. She demonstrated Good understanding as evidenced by agreement to call nurse to assist with mobility. Patient and/or family was verbally educated on the BE FAST acronym for signs/symptoms of CVA and TIA. BE FAST was written on patient's communication board  for visual education and reinforcement. All questions answered with patient indicating good understanding. Fall prevention education was provided and the patient/caregiver indicated understanding., Patient/family have participated as able in goal setting and plan of care. and Patient/family agree to work toward stated goals and plan of care.     Thank you for this referral.  Primo Mishra, PT   Time Calculation: 25 mins

## 2019-10-07 NOTE — PROGRESS NOTES
Reason for Admission:   Vomiting, slurred speech                   RR AT Score:          7           Plan for utilizing home health: [facility].                    Current Advanced Directive/Advance Care Plan: Does not have an advance directive or MPOA                         Transition of Care Plan:    Demographics,  NOK and PCP verified. Patient answered all questions appropriately and has noticeable slured speech. Her son, Caryn Leon [phone] and his girlfriend, Alma Delia Sweet [phone] were at the bedside. Patient stated she lives alone and has been independent. At discharge it is recommended patient have someone stay with her. Gogo Pulido stated patient would go stay with Alma Delia Em at [address] [facility], [address] [place] [address] at discharge. Patient/son requested patient be screened for personal care in the home and have the UAI sent to [facility] to [facility] [phone] Fax [phone]. Home health therapy has been recommended and freedom of choice provided. Referral placed to [facility] [place] per patient request.    IDALIA Plan:    * Disposition-will go stay with Alma Delia Em so that patient has 24/7 support  * Referral placed to [address] [place] [address]  * F/U with Dr. Haley Talley  * UAI to be completed and sent to [facility] for consumer directed personal care    Care Management Interventions  PCP Verified by CM:  Yes  Transition of Care Consult (CM Consult): [address]: Yes  Discharge Durable Medical Equipment: No  Physical Therapy Consult: No  Occupational Therapy Consult: No  Speech Therapy Consult: No  Current Support Network: Lives Alone  Confirm Follow Up Transport: Family  Plan discussed with Pt/Family/Caregiver: Yes  Freedom of Choice Offered: Yes  Discharge Location  Discharge Placement: Home with home health           [address]

## 2019-10-07 NOTE — CONSULTS
NEUROLOGY CONSULTATION    DATE OF CONSULTATION: 10/7/2019    CONSULTED BY:Dr LILIAN Gonsales    REASON FOR CONSULT: Altered mental status/CVA      HISTORY OF PRESENT ILLNESS  Veronica Parker is a 59 y.o. right handed black female with history of hypertension, anxiety disorder, who presents to the emergency room with altered mental status. According to patient, few days prior to presentation, she started having abdominal pain, nausea and vomiting, went to the [facility] emergency room, was treated and the vomiting improved. Patient lives by herself, however, her son's girlfriend always checks on her, on the day of presentation, son's girlfriend was said to have checked on her but she did not appear to be herself, speech was slurred, at this time, patient's son's girlfriend went over and brought her to the emergency room. In the emergency room, patient was noted to be somewhat confused and speech was slowly, she was subsequently admitted for further evaluation and management for CVA. Initial CT scan of the head was unremarkable. At the time of interview, patient was more alert, she says she was not sure when she got to the hospital, she noted that she also has been having headaches. Headache was throbbing in nature mostly frontal still with dizziness. She says she occasionally experiences headaches. She denies numbness or tingling sensation, loss of consciousness, dysphagia or odynophagia.   Review of Systems - General ROS: positive for  - fatigue, night sweats and sleep disturbance  Psychological ROS: positive for - anxiety, depression and sleep disturbances  Ophthalmic ROS: positive for - blurry vision and decreased vision  ENT ROS: positive for - headaches, tinnitus, vertigo and visual changes  Allergy and Immunology ROS: negative  Hematological and Lymphatic ROS: negative  Endocrine ROS: negative  Respiratory ROS: no cough, shortness of breath, or wheezing  Cardiovascular ROS: no chest pain or dyspnea on exertion  Gastrointestinal ROS: no abdominal pain, change in bowel habits, or black or bloody stools  Genito-Urinary ROS: no dysuria, trouble voiding, or hematuria  Musculoskeletal ROS: positive for - joint pain, muscle pain and muscular weakness  Neurological ROS: positive for - confusion, dizziness, headaches, visual changes and weakness  Dermatological ROS: negative      PMH  History reviewed. No pertinent past medical history. SH  Social History     Socioeconomic History    Marital status: SINGLE     Spouse name: Not on file    Number of children: Not on file    Years of education: Not on file    Highest education level: Not on file   Tobacco Use    Smoking status: Former Smoker    Smokeless tobacco: Never Used   Substance and Sexual Activity    Alcohol use: Not Currently    Drug use: Not Currently       FH  History reviewed. No pertinent family history.     ALLERGIES  No Known Allergies    CURRENT MEDS  Current Facility-Administered Medications   Medication Dose Route Frequency Provider Last Rate Last Dose    cefTRIAXone (ROCEPHIN) 1 g in 0.9% sodium chloride (MBP/ADV) 50 mL  1 g IntraVENous Q24H Clark Younger  mL/hr at 10/07/19 0859 1 g at 10/07/19 0859    atorvastatin (LIPITOR) tablet 40 mg  40 mg Oral QHS Clark Younger MD        enoxaparin (LOVENOX) injection 40 mg  40 mg SubCUTAneous Q24H Clark Younger MD   40 mg at 10/07/19 0857    0.9% sodium chloride with KCl 40 mEq/L infusion   IntraVENous CONTINUOUS Clark Younger MD 75 mL/hr at 10/07/19 1102      sodium chloride (NS) flush 10 mL  10 mL IntraVENous PRN Clark Younger MD   10 mL at 10/07/19 1531    sodium chloride (NS) flush        Stopped at 10/07/19 1600    sodium chloride (NS) flush 5-40 mL  5-40 mL IntraVENous Q8H Kumar LOONEY MD   10 mL at 10/07/19 1422    aspirin chewable tablet 81 mg  81 mg Oral DAILY Clark Younger MD   81 mg at 10/07/19 0858    PARoxetine (PAXIL) tablet 40 mg  40 mg Oral DAILY Travis Buchanan Veronica Rojas MD   40 mg at 10/07/19 0858    pantoprazole (PROTONIX) tablet 40 mg  40 mg Oral DAILY Ladonna Cohen MD   40 mg at 10/07/19 6337    ALPRAZolam Molly Skiff) tablet 0.25 mg  0.25 mg Oral QHS PRN Ladonna Cohen MD           ROS  As per HPI  LABS  Recent Results (from the past 48 hour(s))   GLUCOSE, POC    Collection Time: 10/06/19  4:14 PM   Result Value Ref Range    Glucose (POC) 114 (H) 65 - 100 mg/dL    Performed by Marita Savage    CBC WITH AUTOMATED DIFF    Collection Time: 10/06/19  4:20 PM   Result Value Ref Range    WBC 5.3 3.6 - 11.0 K/uL    RBC 4.96 3.80 - 5.20 M/uL    HGB 11.6 11.5 - 16.0 g/dL    HCT 37.6 35.0 - 47.0 %    MCV 75.8 (L) 80.0 - 99.0 FL    MCH 23.4 (L) 26.0 - 34.0 PG    MCHC 30.9 30.0 - 36.5 g/dL    RDW 14.6 (H) 11.5 - 14.5 %    PLATELET 400 (H) 322 - 400 K/uL    MPV 8.5 (L) 8.9 - 12.9 FL    NRBC 0.0 0  WBC    ABSOLUTE NRBC 0.00 0.00 - 0.01 K/uL    NEUTROPHILS 61 32 - 75 %    LYMPHOCYTES 33 12 - 49 %    MONOCYTES 6 5 - 13 %    EOSINOPHILS 0 0 - 7 %    BASOPHILS 0 0 - 1 %    IMMATURE GRANULOCYTES 0 0.0 - 0.5 %    ABS. NEUTROPHILS 3.2 1.8 - 8.0 K/UL    ABS. LYMPHOCYTES 1.7 0.8 - 3.5 K/UL    ABS. MONOCYTES 0.3 0.0 - 1.0 K/UL    ABS. EOSINOPHILS 0.0 0.0 - 0.4 K/UL    ABS. BASOPHILS 0.0 0.0 - 0.1 K/UL    ABS. IMM. GRANS. 0.0 0.00 - 0.04 K/UL    DF AUTOMATED     METABOLIC PANEL, COMPREHENSIVE    Collection Time: 10/06/19  4:20 PM   Result Value Ref Range    Sodium 141 136 - 145 mmol/L    Potassium 3.1 (L) 3.5 - 5.1 mmol/L    Chloride 101 97 - 108 mmol/L    CO2 25 21 - 32 mmol/L    Anion gap 15 5 - 15 mmol/L    Glucose 128 (H) 65 - 100 mg/dL    BUN 18 6 - 20 MG/DL    Creatinine 1.39 (H) 0.55 - 1.02 MG/DL    BUN/Creatinine ratio 13 12 - 20      GFR est AA 47 (L) >60 ml/min/1.73m2    GFR est non-AA 39 (L) >60 ml/min/1.73m2    Calcium 10.2 (H) 8.5 - 10.1 MG/DL    Bilirubin, total 0.7 0.2 - 1.0 MG/DL    ALT (SGPT) 14 12 - 78 U/L    AST (SGOT) 15 15 - 37 U/L    Alk.  phosphatase 136 (H) 45 - 117 U/L    Protein, total 9.1 (H) 6.4 - 8.2 g/dL    Albumin 4.2 3.5 - 5.0 g/dL    Globulin 4.9 (H) 2.0 - 4.0 g/dL    A-G Ratio 0.9 (L) 1.1 - 2.2     TROPONIN I    Collection Time: 10/06/19  4:20 PM   Result Value Ref Range    Troponin-I, Qt. <0.05 <0.05 ng/mL   EKG, 12 LEAD, INITIAL    Collection Time: 10/06/19  4:23 PM   Result Value Ref Range    Ventricular Rate 103 BPM    Atrial Rate 103 BPM    P-R Interval 174 ms    QRS Duration 72 ms    Q-T Interval 356 ms    QTC Calculation (Bezet) 466 ms    Calculated P Axis 73 degrees    Calculated R Axis 66 degrees    Calculated T Axis 24 degrees    Diagnosis       Sinus tachycardia  Possible Left atrial enlargement  T wave abnormality, consider inferior ischemia  Abnormal ECG  No previous ECGs available     PROTHROMBIN TIME + INR    Collection Time: 10/06/19  4:30 PM   Result Value Ref Range    INR 1.0 0.9 - 1.1      Prothrombin time 10.3 9.0 - 11.1 sec   URINALYSIS W/ REFLEX CULTURE    Collection Time: 10/06/19  8:15 PM   Result Value Ref Range    Color DARK YELLOW      Appearance CLOUDY (A) CLEAR      Specific gravity 1.025 1.003 - 1.030      pH (UA) 6.0 5.0 - 8.0      Protein 30 (A) NEG mg/dL    Glucose NEGATIVE  NEG mg/dL    Ketone >80 (A) NEG mg/dL    Blood SMALL (A) NEG      Urobilinogen 1.0 0.2 - 1.0 EU/dL    Nitrites NEGATIVE  NEG      Leukocyte Esterase LARGE (A) NEG      WBC 10-20 0 - 4 /hpf    RBC 0-5 0 - 5 /hpf    Epithelial cells FEW FEW /lpf    Bacteria 1+ (A) NEG /hpf    UA:UC IF INDICATED URINE CULTURE ORDERED (A) CNI     BILIRUBIN, CONFIRM    Collection Time: 10/06/19  8:15 PM   Result Value Ref Range    Bilirubin UA, confirm NEGATIVE  NEG     POTASSIUM    Collection Time: 10/06/19  9:21 PM   Result Value Ref Range    Potassium 3.6 3.5 - 5.1 mmol/L   CBC W/O DIFF    Collection Time: 10/07/19  4:10 AM   Result Value Ref Range    WBC 5.7 3.6 - 11.0 K/uL    RBC 4.29 3.80 - 5.20 M/uL    HGB 10.1 (L) 11.5 - 16.0 g/dL    HCT 32.7 (L) 35.0 - 47.0 %    MCV 76. 2 (L) 80.0 - 99.0 FL    MCH 23.5 (L) 26.0 - 34.0 PG    MCHC 30.9 30.0 - 36.5 g/dL    RDW 14.5 11.5 - 14.5 %    PLATELET 148 834 - 755 K/uL    MPV 8.7 (L) 8.9 - 12.9 FL    NRBC 0.0 0  WBC    ABSOLUTE NRBC 0.00 0.00 - 0.01 K/uL   LIPID PANEL    Collection Time: 10/07/19  4:10 AM   Result Value Ref Range    LIPID PROFILE          Cholesterol, total 163 <200 MG/DL    Triglyceride 84 <150 MG/DL    HDL Cholesterol 38 MG/DL    LDL, calculated 108.2 (H) 0 - 100 MG/DL    VLDL, calculated 16.8 MG/DL    CHOL/HDL Ratio 4.3 0.0 - 5.0     HEMOGLOBIN A1C WITH EAG    Collection Time: 10/07/19  4:10 AM   Result Value Ref Range    Hemoglobin A1c 6.0 4.2 - 6.3 %    Est. average glucose 925 mg/dL   METABOLIC PANEL, BASIC    Collection Time: 10/07/19  4:10 AM   Result Value Ref Range    Sodium 142 136 - 145 mmol/L    Potassium 3.1 (L) 3.5 - 5.1 mmol/L    Chloride 105 97 - 108 mmol/L    CO2 27 21 - 32 mmol/L    Anion gap 10 5 - 15 mmol/L    Glucose 101 (H) 65 - 100 mg/dL    BUN 19 6 - 20 MG/DL    Creatinine 0.97 0.55 - 1.02 MG/DL    BUN/Creatinine ratio 20 12 - 20      GFR est AA >60 >60 ml/min/1.73m2    GFR est non-AA 59 (L) >60 ml/min/1.73m2    Calcium 9.2 8.5 - 10.1 MG/DL   DUPLEX CAROTID BILATERAL    Collection Time: 10/07/19 10:20 AM   Result Value Ref Range    Right cca dist sys 63.7 cm/s    Right CCA dist thomas 19.5 cm/s    Right CCA prox sys 76.8 cm/s    Right CCA prox thomas 19.5 cm/s    Right ICA dist sys 48.2 cm/s    Right ICA dist thomas 16.9 cm/s    Right ICA mid sys 48.2 cm/s    Right ICA mid thomas 16.9 cm/s    Right ICA prox sys 60.4 cm/s    Right ICA prox thomas 21.1 cm/s    Right vertebral sys 70.6 cm/s    RIGHT VERTEBRAL ARTERY D 17.03 cm/s    Right ICA/CCA sys 1.0     Left CCA prox sys 100.4 cm/s    Left CCA prox thomas 25.4 cm/s    Left ICA dist sys 108.2 cm/s    Left ICA dist thomas 27.4 cm/s    Left ICA mid sys 75.3 cm/s    Left ICA mid thomas 23.3 cm/s    Left ICA prox sys 84.9 cm/s    Left ICA prox thomas 23.4 cm/s Left vertebral sys 57.0 cm/s    LEFT VERTEBRAL ARTERY D 17.55 cm/s    Left ICA/CCA sys 1.08    ECHO ADULT COMPLETE    Collection Time: 10/07/19  3:39 PM   Result Value Ref Range    Aortic Valve Area by Planimetry 1.6 cm2    LVIDd 4.35 3.9 - 5.3 cm    LVPWd 0.74 0.6 - 0.9 cm    LVIDs 2.94 cm    IVSd 0.69 0.6 - 0.9 cm    LV ES Vol A4C 18.9 mL    LVOT d 1.64 cm    LVOT Peak Velocity 99.36 cm/s    LVOT Peak Gradient 3.9 mmHg    Mitral Valve Area by Planimetry 2.9 cm2    MVA (PHT) 6.2 cm2    MV A Harris 47.38 cm/s    MV E Harris 38.21 cm/s    MV E/A 0.80     MV Mean Gradient 1.3 mmHg    Mitral Valve Annulus Velocity Time Integral 18.35 cm    Left Atrium to Aortic Root Ratio 1.06     LV Ejection Fraction MOD 4C 77 %    LA Vol 4C 22.51 22 - 52 mL    LA Area 4C 10.9 cm2    LV Mass .2 67 - 162 g    LV Mass AL Index 55.2 43 - 95 g/m2    MV Peak Gradient 3.5 mmHg    LV ED Vol A4C 81.6 mL    Mitral Regurgitant Peak Velocity 497.56 cm/s    Mitral Valve E Wave Deceleration Time 125.8 ms    Mitral Valve Pressure Half-time 35.4 ms    Left Atrium Major Axis 2.23 cm    Triscuspid Valve Regurgitation Peak Gradient 22.6 mmHg    Pulmonic Valve Max Velocity 74.02 cm/s    Mitral Valve Max Velocity 93.55 cm/s    TR Max Velocity 237.91 cm/s    LA Vol Index 13.28 16 - 28 ml/m2    LVED Vol Index A4C 48.1 mL/m2    LVES Vol Index A4C 11.1 mL/m2    MR Peak Gradient 99.0 mmHg    PV peak gradient 2.2 mmHg    Right Atrial Area 4C 9.08 cm2    Ao Root D 2.11 cm        PHYSICAL EXAM  Visit Vitals  /69 (BP 1 Location: Right arm, BP Patient Position: At rest)   Pulse 70   Temp 98.4 °F (36.9 °C)   Resp 16   Ht 5' 6\" (1.676 m)   Wt 135 lb 8 oz (61.5 kg)   SpO2 100%   Breastfeeding? No   BMI 21.87 kg/m²     General:  Alert, cooperative, no distress. Head:  Normocephalic, without obvious abnormality, atraumatic. Eyes:  Conjunctivae/corneas clear. Pupils equal, round, reactive to light.  Extraocular movements intact, VFF, NO papilledema Lungs: Heart:   Non labored breathing  Regular rate and rhythm, no carotid bruits   Abdomen:   Soft, non-distended   Extremities: Extremities normal, atraumatic, no cyanosis or edema. Pulses: 2+ and symmetric all extremities. Skin: Skin color, texture, turgor normal. No rashes or lesions. Neurologic:  Gen: Attention normal             Language: naming, repetition, dysarthria l             Memory: intact recent and remote memory  Cranial Nerves:  I: smell Not tested   II: visual fields Full to confrontation   II: pupils Equal, round, reactive to light   II: optic disc No papilledema   III,VII: ptosis none   III,IV,VI: extraocular muscles  Full ROM   V: mastication normal   V: facial light touch sensation  normal   VII: facial muscle function   symmetric   VIII: hearing symmetric   IX: soft palate elevation  normal   XI: trapezius strength  5/5   XI: sternocleidomastoid strength 5/5   XI: neck flexion strength  5/5   XII: tongue  midline     Motor: normal bulk and tone, no tremor              Strength: 5-/5 all four extremities  Sensory: Dysesthesia to LT, PP, vibration, and temperature  Coordination: FTN and HTS intact,   Gait Deferred   Reflexes: 2+ throughout  Plantar: Withdrawn     IMPRESSION:  1. Altered mental status  2. Dysarthria  3. TIA  4. Rule out CVA  5. Rule out seizure disorder  6. Headache disorder    RECOMMENDATIONS:  1. MRI brain/ MRA head and carotids  2. TTE  3. Telemetry  4. Permissive HTN (SBP<180/<100)  5. Stroke labs (HgbA1c, TSH, lipid panel)  6. Start ASA 325mg daily  7. Start statin   8. ST/OT/PT eval ordered and will assess patient for rehab  9. EEG  10. Blood for autoimmune work-up, vitamin D, ESR, CK, aldolase, vitamin B12. 11. VTE prophylaxis:     Thank you very much for this consultation.      Manolo Denney MD

## 2019-10-07 NOTE — PROGRESS NOTES
Bedside and Verbal shift change report given to Gian Serrato, RN and Xiomara Agarwal RN (oncoming nurse) by Ar oRsales and Keegan Yung RN (offgoing nurse). Report included the following information SBAR, Kardex, ED Summary, Intake/Output, MAR and Recent Results.

## 2019-10-08 VITALS
WEIGHT: 135.5 LBS | TEMPERATURE: 98.1 F | RESPIRATION RATE: 18 BRPM | HEIGHT: 66 IN | SYSTOLIC BLOOD PRESSURE: 127 MMHG | DIASTOLIC BLOOD PRESSURE: 78 MMHG | HEART RATE: 64 BPM | OXYGEN SATURATION: 100 % | BODY MASS INDEX: 21.78 KG/M2

## 2019-10-08 LAB
25(OH)D3 SERPL-MCNC: 15.6 NG/ML (ref 30–100)
ANION GAP SERPL CALC-SCNC: 10 MMOL/L (ref 5–15)
BACTERIA SPEC CULT: NORMAL
BUN SERPL-MCNC: 14 MG/DL (ref 6–20)
BUN/CREAT SERPL: 13 (ref 12–20)
CALCIUM SERPL-MCNC: 9 MG/DL (ref 8.5–10.1)
CC UR VC: NORMAL
CHLORIDE SERPL-SCNC: 112 MMOL/L (ref 97–108)
CK SERPL-CCNC: 125 U/L (ref 26–192)
CO2 SERPL-SCNC: 23 MMOL/L (ref 21–32)
CREAT SERPL-MCNC: 1.11 MG/DL (ref 0.55–1.02)
CRP SERPL HS-MCNC: 6.1 MG/L
ECHO AO ROOT DIAM: 2.11 CM
ECHO AV AREA PLAN: 1.6 CM2
ECHO LA AREA 4C: 10.9 CM2
ECHO LA MAJOR AXIS: 2.23 CM
ECHO LA TO AORTIC ROOT RATIO: 1.06
ECHO LA VOL 4C: 22.51 ML (ref 22–52)
ECHO LA VOLUME INDEX A4C: 13.28 ML/M2 (ref 16–28)
ECHO LV EDV A4C: 81.6 ML
ECHO LV EDV INDEX A4C: 48.1 ML/M2
ECHO LV EJECTION FRACTION A4C: 77 %
ECHO LV ESV A4C: 18.9 ML
ECHO LV ESV INDEX A4C: 11.1 ML/M2
ECHO LV INTERNAL DIMENSION DIASTOLIC: 4.35 CM (ref 3.9–5.3)
ECHO LV INTERNAL DIMENSION SYSTOLIC: 2.94 CM
ECHO LV IVSD: 0.69 CM (ref 0.6–0.9)
ECHO LV MASS 2D: 102.2 G (ref 67–162)
ECHO LV MASS INDEX 2D: 60.3 G/M2 (ref 43–95)
ECHO LV POSTERIOR WALL DIASTOLIC: 0.74 CM (ref 0.6–0.9)
ECHO LVOT DIAM: 1.64 CM
ECHO LVOT PEAK GRADIENT: 3.9 MMHG
ECHO LVOT PEAK VELOCITY: 99.36 CM/S
ECHO MV A VELOCITY: 47.38 CM/S
ECHO MV AREA PHT: 6.2 CM2
ECHO MV AREA PLAN: 2.9 CM2
ECHO MV E DECELERATION TIME (DT): 125.8 MS
ECHO MV E VELOCITY: 38.21 CM/S
ECHO MV E/A RATIO: 0.81
ECHO MV MAX VELOCITY: 93.55 CM/S
ECHO MV MEAN GRADIENT: 1.3 MMHG
ECHO MV PEAK GRADIENT: 3.5 MMHG
ECHO MV PRESSURE HALF TIME (PHT): 35.4 MS
ECHO MV REGURGITANT PEAK GRADIENT: 99 MMHG
ECHO MV REGURGITANT PEAK VELOCITY: 497.56 CM/S
ECHO MV VTI: 18.35 CM
ECHO PULMONARY ARTERY SYSTOLIC PRESSURE (PASP): 20 MMHG
ECHO PV MAX VELOCITY: 74.02 CM/S
ECHO PV PEAK GRADIENT: 2.2 MMHG
ECHO RA AREA 4C: 9.08 CM2
ECHO TV REGURGITANT MAX VELOCITY: 237.91 CM/S
ECHO TV REGURGITANT PEAK GRADIENT: 22.6 MMHG
ERYTHROCYTE [DISTWIDTH] IN BLOOD BY AUTOMATED COUNT: 14.9 % (ref 11.5–14.5)
ERYTHROCYTE [SEDIMENTATION RATE] IN BLOOD: 28 MM/HR (ref 0–30)
GLUCOSE SERPL-MCNC: 120 MG/DL (ref 65–100)
HCT VFR BLD AUTO: 30.7 % (ref 35–47)
HCYS SERPL-SCNC: 9.7 UMOL/L (ref 3.7–13.9)
HGB BLD-MCNC: 9.3 G/DL (ref 11.5–16)
MCH RBC QN AUTO: 23.7 PG (ref 26–34)
MCHC RBC AUTO-ENTMCNC: 30.3 G/DL (ref 30–36.5)
MCV RBC AUTO: 78.1 FL (ref 80–99)
NRBC # BLD: 0 K/UL (ref 0–0.01)
NRBC BLD-RTO: 0 PER 100 WBC
PLATELET # BLD AUTO: 329 K/UL (ref 150–400)
PMV BLD AUTO: 9.3 FL (ref 8.9–12.9)
POTASSIUM SERPL-SCNC: 4.2 MMOL/L (ref 3.5–5.1)
RBC # BLD AUTO: 3.93 M/UL (ref 3.8–5.2)
RHEUMATOID FACT SERPL-ACNC: <10 IU/ML
RPR SER QL: NONREACTIVE
SERVICE CMNT-IMP: NORMAL
SODIUM SERPL-SCNC: 145 MMOL/L (ref 136–145)
VIT B12 SERPL-MCNC: 363 PG/ML (ref 193–986)
WBC # BLD AUTO: 6.2 K/UL (ref 3.6–11)

## 2019-10-08 PROCEDURE — 96372 THER/PROPH/DIAG INJ SC/IM: CPT

## 2019-10-08 PROCEDURE — 86141 C-REACTIVE PROTEIN HS: CPT

## 2019-10-08 PROCEDURE — 36415 COLL VENOUS BLD VENIPUNCTURE: CPT

## 2019-10-08 PROCEDURE — 96366 THER/PROPH/DIAG IV INF ADDON: CPT

## 2019-10-08 PROCEDURE — 74011000258 HC RX REV CODE- 258: Performed by: INTERNAL MEDICINE

## 2019-10-08 PROCEDURE — 86038 ANTINUCLEAR ANTIBODIES: CPT

## 2019-10-08 PROCEDURE — 86431 RHEUMATOID FACTOR QUANT: CPT

## 2019-10-08 PROCEDURE — 90686 IIV4 VACC NO PRSV 0.5 ML IM: CPT | Performed by: INTERNAL MEDICINE

## 2019-10-08 PROCEDURE — 96361 HYDRATE IV INFUSION ADD-ON: CPT

## 2019-10-08 PROCEDURE — 99218 HC RM OBSERVATION: CPT

## 2019-10-08 PROCEDURE — 83090 ASSAY OF HOMOCYSTEINE: CPT

## 2019-10-08 PROCEDURE — 82550 ASSAY OF CK (CPK): CPT

## 2019-10-08 PROCEDURE — 82607 VITAMIN B-12: CPT

## 2019-10-08 PROCEDURE — 80048 BASIC METABOLIC PNL TOTAL CA: CPT

## 2019-10-08 PROCEDURE — 90471 IMMUNIZATION ADMIN: CPT

## 2019-10-08 PROCEDURE — 85652 RBC SED RATE AUTOMATED: CPT

## 2019-10-08 PROCEDURE — 85027 COMPLETE CBC AUTOMATED: CPT

## 2019-10-08 PROCEDURE — 74011250637 HC RX REV CODE- 250/637: Performed by: INTERNAL MEDICINE

## 2019-10-08 PROCEDURE — 74011250636 HC RX REV CODE- 250/636: Performed by: INTERNAL MEDICINE

## 2019-10-08 PROCEDURE — 82306 VITAMIN D 25 HYDROXY: CPT

## 2019-10-08 PROCEDURE — 82164 ANGIOTENSIN I ENZYME TEST: CPT

## 2019-10-08 PROCEDURE — 86592 SYPHILIS TEST NON-TREP QUAL: CPT

## 2019-10-08 RX ORDER — GUAIFENESIN 100 MG/5ML
81 LIQUID (ML) ORAL DAILY
Qty: 30 TAB | Refills: 0 | Status: SHIPPED | OUTPATIENT
Start: 2019-10-09 | End: 2019-11-08

## 2019-10-08 RX ORDER — ATORVASTATIN CALCIUM 40 MG/1
40 TABLET, FILM COATED ORAL
Qty: 30 TAB | Refills: 0 | Status: SHIPPED | OUTPATIENT
Start: 2019-10-08 | End: 2019-11-07

## 2019-10-08 RX ORDER — CEPHALEXIN 500 MG/1
500 CAPSULE ORAL 4 TIMES DAILY
Qty: 20 CAP | Refills: 0 | Status: SHIPPED | OUTPATIENT
Start: 2019-10-08 | End: 2019-10-13

## 2019-10-08 RX ADMIN — PAROXETINE HYDROCHLORIDE 40 MG: 20 TABLET, FILM COATED ORAL at 08:42

## 2019-10-08 RX ADMIN — ASPIRIN 81 MG 81 MG: 81 TABLET ORAL at 08:42

## 2019-10-08 RX ADMIN — ENOXAPARIN SODIUM 40 MG: 100 INJECTION SUBCUTANEOUS at 08:41

## 2019-10-08 RX ADMIN — CEFTRIAXONE SODIUM 1 G: 1 INJECTION, POWDER, FOR SOLUTION INTRAMUSCULAR; INTRAVENOUS at 08:41

## 2019-10-08 RX ADMIN — INFLUENZA VIRUS VACCINE 0.5 ML: 15; 15; 15; 15 SUSPENSION INTRAMUSCULAR at 12:27

## 2019-10-08 RX ADMIN — PANTOPRAZOLE SODIUM 40 MG: 40 TABLET, DELAYED RELEASE ORAL at 08:42

## 2019-10-08 RX ADMIN — POTASSIUM CHLORIDE AND SODIUM CHLORIDE: 900; 300 INJECTION, SOLUTION INTRAVENOUS at 01:28

## 2019-10-08 NOTE — DISCHARGE SUMMARY
Hospitalist Discharge Summary     Patient ID:  José Gibson  [id number]  89 y.o.  1960    PCP on record: Ludwin Vann MD    Admit date: 10/6/2019  Discharge date and time: 10/8/2019      Admission Diagnoses: TIA (transient ischemic attack) [G45.9]    Discharge Diagnoses:    Principal Problem:    UTI (urinary tract infection) (10/7/2019)    Active Problems:    TIA (transient ischemic attack) (10/6/2019)           Hospital Course:   Acute Metabolic encephalopathy :POA resolved   2/2 UTI   CVA ruled out, possible TIA  DDD with radiculopathy   -tele admission  -ASA/ statin  -Fasting lipid panel-108, Pid2c-4  -neurology consult appreciated   -echo-await results  MRI Brain-unremarkable   Carotid USG- unremarkable   PT/OT eval  Follow UCX, empiric ceftriaxone (got 2 days)  Discharge on keflex for 5 days    Hyperlipidemia   resume statin- inc dose to moderate dose- 40 mg po daily    GERD  Resume omeprazole    History of anxiety/depression  Resume Paxil  Xanax as needed) home medication       CONSULTATIONS:  IP CONSULT TO NEUROLOGY    Excerpted HPI from H&P of Viktor Abrams MD:  José Gibson is a 59 y.o.  female [facility] [address] [facility] [address] who presents to ED with c/o above. Per report,[place] [facility] [place] secondary to abdominal pain and vomiting on Friday and her symptoms have subsided since. She did have an episode of vomiting earlier today and was more confused and son's girlfriend brought her to ED for further evaluation and treatment. She does reports foul sweeling urine but denies dysuria,fever,chills. Off note, at baseline patient capable of doing ADL`s-is hard of hearing and has mild dysarthria . Son and his girlfriend check on her regularly. In ED ,Code S was called and patient was evaluated by teleneurology who noticed slurred speech and decreased sensation on the right side. Stat head CT was done which was unremarkable.      She is being admitted for stroke work-up.      ______________________________________________________________________  DISCHARGE SUMMARY/HOSPITAL COURSE:  for full details see H&P, daily progress notes, labs, consult notes. _______________________________________________________________________  Patient seen and examined by me on discharge day. Pertinent Findings:  Gen:    Not in distress  Chest: Clear lungs  CVS:   Regular rhythm. No edema  Abd:  Soft, not distended, not tender  Neuro:  Alert with good insight. Oriented to person, place, and time   _______________________________________________________________________  DISCHARGE MEDICATIONS:   Current Discharge Medication List      START taking these medications    Details   aspirin 81 mg chewable tablet Take 1 Tab by mouth daily for 30 days. Qty: 30 Tab, Refills: 0      cephALEXin (KEFLEX) 500 mg capsule Take 1 Cap by mouth four (4) times daily for 5 days. Qty: 20 Cap, Refills: 0         CONTINUE these medications which have CHANGED    Details   atorvastatin (LIPITOR) 40 mg tablet Take 1 Tab by mouth nightly for 30 days. Qty: 30 Tab, Refills: 0         CONTINUE these medications which have NOT CHANGED    Details   VITAMIN D3 1,000 unit (25 mcg) tablet Take 1 Tab by mouth daily. Refills: 11      PARoxetine (PAXIL) 40 mg tablet Take 1 Tab by mouth daily. Refills: 7      fluticasone propionate (FLONASE) 50 mcg/actuation nasal spray 1 [place] by Both Nostrils route daily. omeprazole (PRILOSEC) 40 mg capsule Take 40 mg by mouth daily. acetaminophen (TYLENOL) 650 mg TbER Take 650 mg by mouth every eight (8) hours as needed. ALPRAZolam (XANAX) 0.25 mg tablet Take  by mouth nightly as needed (anxiety). My Recommended Diet, Activity, Wound Care, and follow-up labs are listed in the patient's Discharge Insturctions which I have personally completed and reviewed.     _______________________________________________________________________  DISPOSITION:     Home with Family:    Home with HH/PT/OT/RN: x   SNF/LTC:    JOSE:    OTHER:        Condition at Discharge:  Stable  _______________________________________________________________________  Follow up with:   PCP : Jack Soni MD  Follow-up Information     Follow up With Specialties Details Why [address]    Jack Soni, [address] [facility]   [address] [facility]  [phone]                Total time in minutes spent coordinating this discharge (includes going over instructions, follow-up, prescriptions, and preparing report for sign off to her PCP) :  30 minutes    Signed:  Juliet Garcia MD

## 2019-10-08 NOTE — PROGRESS NOTES
Spiritual Care Assessment/Progress Note  [facility]      NAME: Rosa Yanez      MRN: [id number]  AGE: 59 y.o. SEX: female  Orthodox Affiliation: No preference   Language: English     10/8/2019     Total Time (in minutes): 12     Spiritual Assessment begun in [address] through conversation with:         []Patient        [] Family    [] Friend(s)        Reason for Consult: Initial/Spiritual assessment, patient floor     Spiritual beliefs: (Please include comment if needed)     [] Identifies with a tammie tradition:         [] Supported by a tammie community:            [] Claims no spiritual orientation:           [] Seeking spiritual identity:                [] Adheres to an individual form of spirituality:           [x] Not able to assess:                           Identified resources for coping:      [] Prayer                               [] Music                  [] Guided Imagery     [] Family/friends                 [] Pet visits     [] Devotional reading                         [x] Unknown     [] Other:                                            Interventions offered during this visit: (See comments for more details)    Patient Interventions: Affirmation of emotions/emotional suffering, Affirmation of tammie           Plan of Care:     [] Support spiritual and/or cultural needs    [] Support AMD and/or advance care planning process      [] Support grieving process   [] Coordinate Rites and/or Rituals    [] Coordination with community clergy   [x] No spiritual needs identified at this time   [] Detailed Plan of Care below (See Comments)  [] Make referral to Music Therapy  [] Make referral to Pet Therapy     [] Make referral to Addiction services  [] Make referral to [facility]  [] Make referral to Spiritual Care Partner  [] No future visits requested        [] Follow up visits as needed     Comments:   The reason for the visit with the patient on the [facility] unit was to make a spiritual assessment. The patient was alone during the visit. The patient greeted and soon smiled. Asked patient about how she was feeling and the patient was not able to provide a coherent response. The patient was unable to indicate whether she has a spiritual side of her life. The patient attempted to explain where she lives in response to the question. The patient did not share information related to her spiritual life or any other relevant information. Provided ministry of presence and active listening. Advised of  availability. Chaplains will follow as able and/or needed. New information regarding patient indicates that the patient may not been able to hear my questions. Rev.  Janet Keller MDiv   For  Assistance Page [phone])

## 2019-10-08 NOTE — PROGRESS NOTES
Label machine in pt's room broken. Unable to print out labels for blood draw. Spoke to Lab #[id number]. Advised to reorder all labs and if that doesn't work. Print temp label and print out lab orders from [facility].

## 2019-10-08 NOTE — PROGRESS NOTES
IDALIA Plan:    * [facility] for personal care submitted for processing and will be sent to [facility] 10/9/2019  * Appointment scheduled with Dr. Alanna Lam for 10/21 at 4:20 PM  * Referral placed to [address] [place] [address] for nursing, PT/OT      Reviewed the above arrangements with patient's son, May Suárez [phone].     Greer Landau, LCSW

## 2019-10-08 NOTE — PROGRESS NOTES
Patient discharge orders received. The patient received her Flu shot, and telemetry discontinued. The PIV removed with cath tip intact. The patient with a visitor at the bedside. The son's girlfriend Nori Boone called for an up date to when discharge may take place, and at the time, was not clear yet. The visitor in the room stated Nori Boone was on her way. The patient reviewed the instructions and teaching points. All questions asked/answered appropriately. Sofie down at the [facility]. The patient brought down assisted into personal vehicle without incident.

## 2019-10-08 NOTE — DISCHARGE INSTRUCTIONS

## 2019-10-08 NOTE — PROGRESS NOTES
Problem: Falls - Risk of  Goal: *Absence of Falls  Description  Document Dipti Leahy Fall Risk and appropriate interventions in the flowsheet.   Outcome: Progressing Towards Goal  Note:   Fall Risk Interventions:  Mobility Interventions: Patient to call before getting OOB    Mentation Interventions: Door open when patient unattended, Eyeglasses and hearing aids, Increase mobility, Room close to nurse's station    Medication Interventions: Patient to call before getting OOB

## 2019-10-08 NOTE — PROGRESS NOTES
Problem: Falls - Risk of  Goal: *Absence of Falls  Description  Document Tu Huber Fall Risk and appropriate interventions in the flowsheet.   Outcome: Progressing Towards Goal  Note:   Fall Risk Interventions:  Mobility Interventions: Patient to call before getting OOB    Mentation Interventions: Bed/chair exit alarm    Medication Interventions: Patient to call before getting OOB                   Problem: Patient Education: Go to Patient Education Activity  Goal: Patient/Family Education  Outcome: Progressing Towards Goal     Problem: Patient Education: Go to Patient Education Activity  Goal: Patient/Family Education  Outcome: Progressing Towards Goal

## 2019-10-09 LAB
ACE SERPL-CCNC: 16 U/L (ref 14–82)
ANA SER QL: NEGATIVE

## 2019-10-10 ENCOUNTER — HOME HEALTH ADMISSION (OUTPATIENT)
Dept: HOME HEALTH SERVICES | Facility: HOME HEALTH | Age: 59
End: 2019-10-10
Payer: MEDICAID

## 2019-10-10 ENCOUNTER — TELEPHONE (OUTPATIENT)
Dept: CASE MANAGEMENT | Age: 59
End: 2019-10-10

## 2019-10-10 NOTE — PROGRESS NOTES
PROCEDURE: ROUTINE INPATIENT EEG  NAME:   Saul Modi  EEG NUMBER: [id number]  ACCOUNT NUMBER : [de-identified]  MRN:   [id number]  DATE OF SERVICE: 10/7/2019    HISTORY/INDICATION: [facility]  MEDICATIONS: See chart  CONDITIONS OF RECORDING: This is a routine 21-channel EEG recording performed in accordance with the international 10-20 system with one channel devoted to limited EKG. This study was done during states of wakefulness. Photic stimulation and hyperventilation were performed as activating procedures. In addition, eye movement was recorded    DESCRIPTION:   Upon maximal arousal the posterior dominant rhythm has a frequency of 9-11 hz with an amplitude of 20 uV. This activity is symmetric over the bilateral posterior derivations and attenuates with eye opening. Photic stimulation and hyperventilation do not significantly alter the tracing. The patient becomes drowsy, but deeper stages of sleep are not attained . Normal sleep architecture is seen with stage II sleep recognized by the presence of symmetric vertex waves and sleep spindles. There are no focal abnormalities, epileptiform discharges, or electrographic seizures seen. There was a lot of muscle and movement artifact  INTERPRETATION:   The EEG may be considered within normal limits for this patient's age in both awake and early stages of sleep. However, there was slight degradation by movement and muscle artifacts. CLINICAL CORRELATION: A normal EEG does not definitively exclude a diagnosis of epilepsy if clinical suspicion is high consider sleep deprived EEG.      Maxim Reinoso MD

## 2019-10-10 NOTE — PROGRESS NOTES
Neurology Progress Note    NAME:  Chaitanya Jacobs   :   1960   MRN:   [id number]     Date/Time:  10/10/2019 2:17 PM  Subjective:   Says she is feeling better. Still experiences occasional confusion. Review of Systems:  Y  N       Y  N  [] [x]  Fever/chills                                               [] [x]  Chest Pain  [] [x]  Cough                                                       [] [x]  Diarrhea   [] [x]  Sputum                                                     [] [x]  Constipation  [] [x]  SOB/DEXTER                                                [] [x]  Nausea/Vomit  [] [x]  Abd Pain                                                    [x] []  Tolerating PT  [] [x]  Dysuria                                                      [x] []  Tolerating Diet        Medications reviewed:  No current facility-administered medications for this encounter. Current Outpatient Medications   Medication Sig    atorvastatin (LIPITOR) 40 mg tablet Take 1 Tab by mouth nightly for 30 days.  aspirin 81 mg chewable tablet Take 1 Tab by mouth daily for 30 days.  cephALEXin (KEFLEX) 500 mg capsule Take 1 Cap by mouth four (4) times daily for 5 days.  VITAMIN D3 1,000 unit (25 mcg) tablet Take 1 Tab by mouth daily.  PARoxetine (PAXIL) 40 mg tablet Take 1 Tab by mouth daily.  fluticasone propionate (FLONASE) 50 mcg/actuation nasal spray 1 [place] by Both Nostrils route daily.  omeprazole (PRILOSEC) 40 mg capsule Take 40 mg by mouth daily.  acetaminophen (TYLENOL) 650 mg TbER Take 650 mg by mouth every eight (8) hours as needed.  ALPRAZolam (XANAX) 0.25 mg tablet Take  by mouth nightly as needed (anxiety). Objective:   Vitals:  Visit Vitals  /78 (BP 1 Location: Right arm, BP Patient Position: Sitting)   Pulse 64   Temp 98.1 °F (36.7 °C)   Resp 18   Ht 5' 6\" (1.676 m)   Wt 135 lb 8 oz (61.5 kg)   SpO2 100%   Breastfeeding? No   BMI 21.87 kg/m²     No data recorded.       O2 Device: Room air      PHYSICAL EXAM:  General:    Alert, cooperative, no distress, appears stated age. Head:   Normocephalic, without obvious abnormality, atraumatic. Eyes:   Conjunctivae/corneas clear. PERRLA  Nose:  Nares normal. No drainage or sinus tenderness. Throat:    Lips, mucosa, and tongue normal.  No Thrush  Neck:  Supple, symmetrical,  no adenopathy, thyroid: non tender    no carotid bruit and no JVD. Back:    Symmetric,  No CVA tenderness. Lungs:   Clear to auscultation bilaterally. No Wheezing or Rhonchi. No rales. Chest wall:  No tenderness or deformity. No Accessory muscle use. Heart:   Regular rate and rhythm,  no murmur, rub or gallop. Abdomen:   Soft, non-tender. Not distended. Bowel sounds normal. No masses  Extremities: Extremities normal, atraumatic, No cyanosis. No edema. No clubbing  Skin:     Texture, turgor normal. No rashes or lesions. Not Jaundiced  Lymph nodes: Cervical, supraclavicular normal.  Psych:  Good insight. Depressed. Not anxious or agitated. NEUROLOGICAL EXAM:  Appearance: The patient is well developed, well nourished, provides a coherent history and is in no acute distress. Mental Status: Oriented to time, place and person. Mood and affect appropriate. Cranial Nerves:   Intact visual fields. Fundi are benign. NISA, EOM's full, no nystagmus, no ptosis. Facial sensation is normal. Corneal reflexes are intact. Facial movement is symmetric. Hearing is normal bilaterally. Palate is midline with normal sternocleidomastoid and trapezius muscles are normal. Tongue is midline. Motor:  5/5 strength in upper and lower proximal and distal muscles. Normal bulk and tone. No fasciculations. Reflexes:   Deep tendon reflexes 2+/4 and symmetrical.   Sensory:    Equivocal sensation to touch, pinprick and vibration. Gait:   Deferred. Tremor:    Mild tremor noted. Cerebellar:  No cerebellar signs present.    Neurovascular:  Normal heart sounds and regular rhythm, peripheral pulses intact, and no carotid bruits. Lab Data Reviewed:    No results found for this or any previous visit (from the past 24 hour(s)).     Assesment  Principal Problem:    UTI (urinary tract infection) (10/7/2019)    Active Problems:    TIA (transient ischemic attack) (10/6/2019)    [facility]  Paresthesia  SILVESTRE  ___________________________________________________  PLAN:  Continue current management            ___________________________________________________    Attending Physician: Katie Walker MD

## 2019-10-10 NOTE — TELEPHONE ENCOUNTER
CM attempt follow-up call unable to leave a VM patient has not set-up a VM. CM will attempt a 2nd call. patient admitted for TIA will need to discuss stroke protocol, sign and symptoms.     [address]  [phone]

## 2019-10-12 ENCOUNTER — HOME CARE VISIT (OUTPATIENT)
Dept: SCHEDULING | Facility: HOME HEALTH | Age: 59
End: 2019-10-12
Payer: MEDICAID

## 2019-10-12 PROCEDURE — 400013 HH SOC

## 2019-10-12 PROCEDURE — G0299 HHS/HOSPICE OF RN EA 15 MIN: HCPCS

## 2019-10-14 ENCOUNTER — HOME CARE VISIT (OUTPATIENT)
Dept: SCHEDULING | Facility: HOME HEALTH | Age: 59
End: 2019-10-14
Payer: MEDICAID

## 2019-10-14 PROCEDURE — G0151 HHCP-SERV OF PT,EA 15 MIN: HCPCS

## 2019-10-15 ENCOUNTER — HOME CARE VISIT (OUTPATIENT)
Dept: SCHEDULING | Facility: HOME HEALTH | Age: 59
End: 2019-10-15
Payer: MEDICAID

## 2019-10-15 VITALS
WEIGHT: 135 LBS | HEART RATE: 61 BPM | TEMPERATURE: 98 F | SYSTOLIC BLOOD PRESSURE: 124 MMHG | DIASTOLIC BLOOD PRESSURE: 96 MMHG | BODY MASS INDEX: 21.8 KG/M2 | OXYGEN SATURATION: 98 %

## 2019-10-15 VITALS
DIASTOLIC BLOOD PRESSURE: 66 MMHG | TEMPERATURE: 97.8 F | HEART RATE: 75 BPM | OXYGEN SATURATION: 99 % | SYSTOLIC BLOOD PRESSURE: 120 MMHG | RESPIRATION RATE: 18 BRPM

## 2019-10-15 PROCEDURE — G0299 HHS/HOSPICE OF RN EA 15 MIN: HCPCS

## 2019-10-16 ENCOUNTER — HOME CARE VISIT (OUTPATIENT)
Dept: SCHEDULING | Facility: HOME HEALTH | Age: 59
End: 2019-10-16
Payer: MEDICAID

## 2019-10-16 VITALS — HEART RATE: 63 BPM | OXYGEN SATURATION: 99 % | TEMPERATURE: 97.7 F

## 2019-10-16 PROCEDURE — G0152 HHCP-SERV OF OT,EA 15 MIN: HCPCS

## 2019-10-17 ENCOUNTER — HOME CARE VISIT (OUTPATIENT)
Dept: SCHEDULING | Facility: HOME HEALTH | Age: 59
End: 2019-10-17
Payer: MEDICAID

## 2019-10-17 VITALS
RESPIRATION RATE: 16 BRPM | TEMPERATURE: 98.1 F | OXYGEN SATURATION: 97 % | SYSTOLIC BLOOD PRESSURE: 116 MMHG | HEART RATE: 62 BPM | DIASTOLIC BLOOD PRESSURE: 60 MMHG

## 2019-10-17 PROCEDURE — G0151 HHCP-SERV OF PT,EA 15 MIN: HCPCS

## 2019-10-18 ENCOUNTER — HOME CARE VISIT (OUTPATIENT)
Dept: SCHEDULING | Facility: HOME HEALTH | Age: 59
End: 2019-10-18
Payer: MEDICAID

## 2019-10-18 VITALS
RESPIRATION RATE: 18 BRPM | OXYGEN SATURATION: 98 % | SYSTOLIC BLOOD PRESSURE: 118 MMHG | TEMPERATURE: 97.8 F | DIASTOLIC BLOOD PRESSURE: 62 MMHG | HEART RATE: 78 BPM

## 2019-10-18 PROCEDURE — G0299 HHS/HOSPICE OF RN EA 15 MIN: HCPCS

## 2019-10-22 ENCOUNTER — HOME CARE VISIT (OUTPATIENT)
Dept: SCHEDULING | Facility: HOME HEALTH | Age: 59
End: 2019-10-22
Payer: MEDICAID

## 2019-10-22 VITALS
TEMPERATURE: 97.8 F | RESPIRATION RATE: 16 BRPM | DIASTOLIC BLOOD PRESSURE: 70 MMHG | OXYGEN SATURATION: 99 % | HEART RATE: 58 BPM | SYSTOLIC BLOOD PRESSURE: 128 MMHG

## 2019-10-22 PROCEDURE — G0300 HHS/HOSPICE OF LPN EA 15 MIN: HCPCS

## 2019-10-22 PROCEDURE — G0151 HHCP-SERV OF PT,EA 15 MIN: HCPCS

## 2019-10-23 ENCOUNTER — TELEPHONE (OUTPATIENT)
Dept: CASE MANAGEMENT | Age: 59
End: 2019-10-23

## 2019-10-23 VITALS
DIASTOLIC BLOOD PRESSURE: 74 MMHG | OXYGEN SATURATION: 98 % | HEART RATE: 58 BPM | RESPIRATION RATE: 18 BRPM | TEMPERATURE: 97.8 F | SYSTOLIC BLOOD PRESSURE: 114 MMHG

## 2019-10-23 NOTE — TELEPHONE ENCOUNTER
CM follow-up call with patient discuss signs and symptoms of stroke patient states she kept her follow-up with Dr. Cristian Hargrove and is taken medication as prescribe.      [address] [facility]  [phone]

## 2019-10-25 ENCOUNTER — HOME CARE VISIT (OUTPATIENT)
Dept: SCHEDULING | Facility: HOME HEALTH | Age: 59
End: 2019-10-25
Payer: MEDICAID

## 2019-10-25 PROCEDURE — G0300 HHS/HOSPICE OF LPN EA 15 MIN: HCPCS

## 2019-10-27 VITALS
SYSTOLIC BLOOD PRESSURE: 120 MMHG | RESPIRATION RATE: 18 BRPM | DIASTOLIC BLOOD PRESSURE: 83 MMHG | OXYGEN SATURATION: 98 % | HEART RATE: 56 BPM | TEMPERATURE: 97.7 F

## 2019-10-29 ENCOUNTER — HOME CARE VISIT (OUTPATIENT)
Dept: SCHEDULING | Facility: HOME HEALTH | Age: 59
End: 2019-10-29
Payer: MEDICAID

## 2019-10-29 VITALS
DIASTOLIC BLOOD PRESSURE: 64 MMHG | HEART RATE: 64 BPM | OXYGEN SATURATION: 98 % | RESPIRATION RATE: 18 BRPM | SYSTOLIC BLOOD PRESSURE: 114 MMHG | TEMPERATURE: 97.7 F

## 2019-10-29 PROCEDURE — G0300 HHS/HOSPICE OF LPN EA 15 MIN: HCPCS

## 2019-11-05 ENCOUNTER — HOME CARE VISIT (OUTPATIENT)
Dept: SCHEDULING | Facility: HOME HEALTH | Age: 59
End: 2019-11-05
Payer: MEDICAID

## 2019-11-05 VITALS
HEART RATE: 67 BPM | DIASTOLIC BLOOD PRESSURE: 68 MMHG | TEMPERATURE: 98 F | RESPIRATION RATE: 16 BRPM | SYSTOLIC BLOOD PRESSURE: 112 MMHG | OXYGEN SATURATION: 98 %

## 2019-11-05 PROCEDURE — G0299 HHS/HOSPICE OF RN EA 15 MIN: HCPCS

## 2019-11-15 ENCOUNTER — HOME CARE VISIT (OUTPATIENT)
Dept: SCHEDULING | Facility: HOME HEALTH | Age: 59
End: 2019-11-15
Payer: MEDICAID

## 2019-11-15 PROCEDURE — G0299 HHS/HOSPICE OF RN EA 15 MIN: HCPCS

## 2019-11-16 VITALS
RESPIRATION RATE: 18 BRPM | TEMPERATURE: 97.7 F | SYSTOLIC BLOOD PRESSURE: 116 MMHG | HEART RATE: 68 BPM | DIASTOLIC BLOOD PRESSURE: 74 MMHG | OXYGEN SATURATION: 98 %

## 2019-11-20 ENCOUNTER — HOME CARE VISIT (OUTPATIENT)
Dept: SCHEDULING | Facility: HOME HEALTH | Age: 59
End: 2019-11-20
Payer: MEDICAID

## 2019-11-20 PROCEDURE — G0299 HHS/HOSPICE OF RN EA 15 MIN: HCPCS

## 2019-11-26 VITALS
SYSTOLIC BLOOD PRESSURE: 112 MMHG | OXYGEN SATURATION: 99 % | RESPIRATION RATE: 20 BRPM | TEMPERATURE: 97.9 F | HEART RATE: 64 BPM | DIASTOLIC BLOOD PRESSURE: 74 MMHG
